# Patient Record
Sex: MALE | Race: BLACK OR AFRICAN AMERICAN | NOT HISPANIC OR LATINO | Employment: OTHER | URBAN - METROPOLITAN AREA
[De-identification: names, ages, dates, MRNs, and addresses within clinical notes are randomized per-mention and may not be internally consistent; named-entity substitution may affect disease eponyms.]

---

## 2017-06-05 ENCOUNTER — OFFICE VISIT (OUTPATIENT)
Dept: INTERNAL MEDICINE | Facility: CLINIC | Age: 52
End: 2017-06-05
Payer: MEDICAID

## 2017-06-05 ENCOUNTER — LAB VISIT (OUTPATIENT)
Dept: LAB | Facility: HOSPITAL | Age: 52
End: 2017-06-05
Attending: INTERNAL MEDICINE
Payer: MEDICAID

## 2017-06-05 VITALS
BODY MASS INDEX: 27.59 KG/M2 | HEART RATE: 56 BPM | SYSTOLIC BLOOD PRESSURE: 120 MMHG | WEIGHT: 197.06 LBS | TEMPERATURE: 98 F | HEIGHT: 71 IN | DIASTOLIC BLOOD PRESSURE: 84 MMHG

## 2017-06-05 DIAGNOSIS — E55.9 VITAMIN D DEFICIENCY: ICD-10-CM

## 2017-06-05 DIAGNOSIS — Z00.00 HEALTH CARE MAINTENANCE: ICD-10-CM

## 2017-06-05 DIAGNOSIS — M54.9 BACK PAIN WITH RADIATION: Primary | ICD-10-CM

## 2017-06-05 LAB
25(OH)D3+25(OH)D2 SERPL-MCNC: 36 NG/ML
ALBUMIN SERPL BCP-MCNC: 4.2 G/DL
ALP SERPL-CCNC: 47 U/L
ALT SERPL W/O P-5'-P-CCNC: 21 U/L
ANION GAP SERPL CALC-SCNC: 8 MMOL/L
AST SERPL-CCNC: 25 U/L
BILIRUB SERPL-MCNC: 1.1 MG/DL
BUN SERPL-MCNC: 19 MG/DL
CALCIUM SERPL-MCNC: 9.5 MG/DL
CHLORIDE SERPL-SCNC: 105 MMOL/L
CHOLEST/HDLC SERPL: 3.4 {RATIO}
CO2 SERPL-SCNC: 27 MMOL/L
CREAT SERPL-MCNC: 1.3 MG/DL
EST. GFR  (AFRICAN AMERICAN): >60 ML/MIN/1.73 M^2
EST. GFR  (NON AFRICAN AMERICAN): >60 ML/MIN/1.73 M^2
GLUCOSE SERPL-MCNC: 91 MG/DL
HDL/CHOLESTEROL RATIO: 29.6 %
HDLC SERPL-MCNC: 162 MG/DL
HDLC SERPL-MCNC: 48 MG/DL
LDLC SERPL CALC-MCNC: 101 MG/DL
NONHDLC SERPL-MCNC: 114 MG/DL
POTASSIUM SERPL-SCNC: 4.3 MMOL/L
PROT SERPL-MCNC: 7.3 G/DL
SODIUM SERPL-SCNC: 140 MMOL/L
TRIGL SERPL-MCNC: 65 MG/DL

## 2017-06-05 PROCEDURE — 99214 OFFICE O/P EST MOD 30 MIN: CPT | Mod: S$PBB,,, | Performed by: INTERNAL MEDICINE

## 2017-06-05 PROCEDURE — 36415 COLL VENOUS BLD VENIPUNCTURE: CPT

## 2017-06-05 PROCEDURE — 99999 PR PBB SHADOW E&M-EST. PATIENT-LVL III: CPT | Mod: PBBFAC,,, | Performed by: INTERNAL MEDICINE

## 2017-06-05 PROCEDURE — 82306 VITAMIN D 25 HYDROXY: CPT

## 2017-06-05 PROCEDURE — 80053 COMPREHEN METABOLIC PANEL: CPT

## 2017-06-05 PROCEDURE — 80061 LIPID PANEL: CPT

## 2017-06-05 RX ORDER — MELOXICAM 15 MG/1
15 TABLET ORAL DAILY
Qty: 60 TABLET | Refills: 3 | Status: SHIPPED | OUTPATIENT
Start: 2017-06-05

## 2017-06-05 RX ORDER — ACETAMINOPHEN 325 MG/1
325 TABLET ORAL EVERY 6 HOURS PRN
COMMUNITY

## 2017-06-05 RX ORDER — CYCLOBENZAPRINE HCL 10 MG
10 TABLET ORAL 3 TIMES DAILY PRN
Qty: 60 TABLET | Refills: 3 | Status: SHIPPED | OUTPATIENT
Start: 2017-06-05 | End: 2017-07-05

## 2017-06-05 RX ORDER — CHOLECALCIFEROL (VITAMIN D3) 25 MCG
1000 TABLET ORAL DAILY
COMMUNITY
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
## 2017-06-09 ENCOUNTER — OFFICE VISIT (OUTPATIENT)
Dept: INTERNAL MEDICINE | Facility: CLINIC | Age: 52
End: 2017-06-09
Payer: MEDICAID

## 2017-06-09 ENCOUNTER — LAB VISIT (OUTPATIENT)
Dept: LAB | Facility: HOSPITAL | Age: 52
End: 2017-06-09
Attending: INTERNAL MEDICINE
Payer: MEDICAID

## 2017-06-09 VITALS
HEIGHT: 71 IN | HEART RATE: 54 BPM | BODY MASS INDEX: 26.57 KG/M2 | DIASTOLIC BLOOD PRESSURE: 62 MMHG | WEIGHT: 189.81 LBS | SYSTOLIC BLOOD PRESSURE: 106 MMHG

## 2017-06-09 DIAGNOSIS — Z11.3 ROUTINE SCREENING FOR STI (SEXUALLY TRANSMITTED INFECTION): ICD-10-CM

## 2017-06-09 DIAGNOSIS — M54.9 BACK PAIN WITH RADIATION: Primary | ICD-10-CM

## 2017-06-09 LAB
HAV IGM SERPL QL IA: NEGATIVE
HBV CORE IGM SERPL QL IA: NEGATIVE
HBV SURFACE AG SERPL QL IA: NEGATIVE
HCV AB SERPL QL IA: NEGATIVE
HIV 1+2 AB+HIV1 P24 AG SERPL QL IA: NEGATIVE

## 2017-06-09 PROCEDURE — 99999 PR PBB SHADOW E&M-EST. PATIENT-LVL III: CPT | Mod: PBBFAC,,, | Performed by: INTERNAL MEDICINE

## 2017-06-09 PROCEDURE — 80074 ACUTE HEPATITIS PANEL: CPT

## 2017-06-09 PROCEDURE — 86592 SYPHILIS TEST NON-TREP QUAL: CPT

## 2017-06-09 PROCEDURE — 36415 COLL VENOUS BLD VENIPUNCTURE: CPT

## 2017-06-09 PROCEDURE — 99214 OFFICE O/P EST MOD 30 MIN: CPT | Mod: S$PBB,,, | Performed by: INTERNAL MEDICINE

## 2017-06-09 PROCEDURE — 86695 HERPES SIMPLEX TYPE 1 TEST: CPT

## 2017-06-09 PROCEDURE — 86703 HIV-1/HIV-2 1 RESULT ANTBDY: CPT

## 2017-06-09 PROCEDURE — 86696 HERPES SIMPLEX TYPE 2 TEST: CPT

## 2017-06-09 NOTE — PROGRESS NOTES
Subjective:       Patient ID: Royer Driscoll is a 51 y.o. male.    Chief Complaint: Back Pain    HPI - I just saw Mr. Driscoll 4 days ago.  He neglected to mention concerns about STI at that time - he has had a second partner (female) and had irritation of the tip of his penis after intercourse.  No dysuria/discharge or other rashes.  He wants full STI screening.  Drinks minimally; no drug use.    He also requested an MRI for his chronic low back pain.  He notes worsening of pain this weekend after doing some work around the house.      PMH:   Chronic thoracic spine pain  Vitamin D deficiency  ED  Thrombocytopenia, mild     Meds: none    Review of Systems   Constitutional: Negative for fever.   HENT: Negative for congestion.    Respiratory: Negative for shortness of breath.    Cardiovascular: Negative for chest pain.   Gastrointestinal: Negative for abdominal pain.   Genitourinary: Positive for genital sores. Negative for difficulty urinating, discharge, dysuria, flank pain, penile pain, penile swelling, scrotal swelling, testicular pain and urgency.   Musculoskeletal: Negative for arthralgias.   Skin: Negative for rash.   Neurological: Negative for headaches.   Psychiatric/Behavioral: Negative for sleep disturbance.       Objective:      Physical Exam   Constitutional: He appears well-developed and well-nourished. No distress.   Abdominal: Hernia confirmed negative in the right inguinal area and confirmed negative in the left inguinal area.   Genitourinary: Testes normal and penis normal. Right testis shows no mass, no swelling and no tenderness. Left testis shows no mass, no swelling and no tenderness. Uncircumcised.   Neurological: He is alert.   Skin: He is not diaphoretic.   Psychiatric: He has a normal mood and affect.   Nursing note and vitals reviewed.      Assessment:       1. Back pain with radiation    2. Routine screening for STI (sexually transmitted infection)        Plan:       Royer was seen today  for back pain.    Diagnoses and all orders for this visit:    Back pain with radiation - worsening.  Will ask pain clinic to assist with management.  -     Ambulatory consult to Pain Clinic    Routine screening for STI (sexually transmitted infection) - new complaint.  Given this lesion on the tip of his penis, think he has HSV.  Will screen broadly for STI's today.  -     HIV-1 and HIV-2 antibodies; Future  -     C. trachomatis/N. gonorrhoeae by AMP DNA Urine  -     RPR; Future  -     Hepatitis panel, acute; Future  -     HERPES SIMPLEX 1 & 2 IGM; Future    rtc prn, or as scheduled in a year    MELANIE Dyer MD MPH  Staff Internist

## 2017-06-10 LAB
C TRACH DNA SPEC QL NAA+PROBE: NOT DETECTED
N GONORRHOEA DNA SPEC QL NAA+PROBE: NOT DETECTED
RPR SER QL: NORMAL

## 2017-06-15 LAB — HSV1+2 IGM SER IA-ACNC: NORMAL INDEX

## 2019-04-10 ENCOUNTER — HOSPITAL ENCOUNTER (OUTPATIENT)
Dept: RADIOLOGY | Facility: HOSPITAL | Age: 54
Discharge: HOME | End: 2019-04-10
Attending: GENERAL PRACTICE
Payer: MEDICAID

## 2019-04-10 DIAGNOSIS — M75.21 BICIPITAL TENDINITIS OF RIGHT SHOULDER: ICD-10-CM

## 2019-04-10 DIAGNOSIS — M75.21 BICIPITAL TENDINITIS OF RIGHT SHOULDER: Primary | ICD-10-CM

## 2019-04-10 PROCEDURE — 73030 X-RAY EXAM OF SHOULDER: CPT | Mod: 26,RT,, | Performed by: RADIOLOGY

## 2019-04-10 PROCEDURE — 73030 X-RAY EXAM OF SHOULDER: CPT | Mod: TC,FY,RT

## 2019-04-10 PROCEDURE — 73030 XR SHOULDER COMPLETE 2 OR MORE VIEWS RIGHT: ICD-10-PCS | Mod: 26,RT,, | Performed by: RADIOLOGY

## 2019-06-26 DIAGNOSIS — M75.21 BICIPITAL TENDINITIS OF RIGHT SHOULDER: Primary | ICD-10-CM

## 2019-07-02 DIAGNOSIS — M75.21 BICIPITAL TENDINITIS OF RIGHT SHOULDER: Primary | ICD-10-CM

## 2019-07-20 ENCOUNTER — TELEPHONE (OUTPATIENT)
Dept: RADIOLOGY | Facility: HOSPITAL | Age: 54
End: 2019-07-20

## 2019-07-22 ENCOUNTER — HOSPITAL ENCOUNTER (OUTPATIENT)
Dept: RADIOLOGY | Facility: HOSPITAL | Age: 54
Discharge: HOME | End: 2019-07-22
Attending: GENERAL PRACTICE
Payer: MEDICAID

## 2019-07-22 DIAGNOSIS — M75.21 BICIPITAL TENDINITIS OF RIGHT SHOULDER: ICD-10-CM

## 2019-07-22 PROCEDURE — 73221 MRI SHOULDER WITHOUT CONTRAST RIGHT: ICD-10-PCS | Mod: 26,RT,, | Performed by: RADIOLOGY

## 2019-07-22 PROCEDURE — 73221 MRI JOINT UPR EXTREM W/O DYE: CPT | Mod: 26,RT,, | Performed by: RADIOLOGY

## 2019-07-22 PROCEDURE — 73221 MRI JOINT UPR EXTREM W/O DYE: CPT | Mod: TC,RT

## 2021-03-09 ENCOUNTER — HOSPITAL ENCOUNTER (OUTPATIENT)
Dept: RADIOLOGY | Facility: HOSPITAL | Age: 56
Discharge: HOME | End: 2021-03-09
Attending: NURSE PRACTITIONER
Payer: MEDICAID

## 2021-03-09 DIAGNOSIS — M25.551 PAIN IN RIGHT HIP: ICD-10-CM

## 2021-03-09 PROCEDURE — 73502 X-RAY EXAM HIP UNI 2-3 VIEWS: CPT | Mod: TC,FY,PO,RT

## 2021-03-09 PROCEDURE — 73502 XR HIP 2 VIEW RIGHT: ICD-10-PCS | Mod: 26,RT,, | Performed by: RADIOLOGY

## 2021-03-09 PROCEDURE — 73502 X-RAY EXAM HIP UNI 2-3 VIEWS: CPT | Mod: 26,RT,, | Performed by: RADIOLOGY

## 2021-04-15 ENCOUNTER — PATIENT MESSAGE (OUTPATIENT)
Dept: RESEARCH | Facility: HOSPITAL | Age: 56
End: 2021-04-15

## 2022-01-20 ENCOUNTER — CLINICAL SUPPORT (OUTPATIENT)
Dept: REHABILITATION | Facility: OTHER | Age: 57
End: 2022-01-20
Attending: GENERAL PRACTICE
Payer: MEDICAID

## 2022-01-20 DIAGNOSIS — R68.89 DECREASED FUNCTIONAL ACTIVITY TOLERANCE: ICD-10-CM

## 2022-01-20 DIAGNOSIS — M54.50 LOW BACK PAIN: ICD-10-CM

## 2022-01-20 DIAGNOSIS — M54.9 BACK PAIN WITH RADIATION: Primary | ICD-10-CM

## 2022-01-20 DIAGNOSIS — R29.898 WEAKNESS OF BOTH LOWER EXTREMITIES: ICD-10-CM

## 2022-01-20 PROCEDURE — 97530 THERAPEUTIC ACTIVITIES: CPT

## 2022-01-20 PROCEDURE — 97161 PT EVAL LOW COMPLEX 20 MIN: CPT

## 2022-01-20 PROCEDURE — 97110 THERAPEUTIC EXERCISES: CPT

## 2022-01-24 PROBLEM — R68.89 DECREASED FUNCTIONAL ACTIVITY TOLERANCE: Status: ACTIVE | Noted: 2022-01-24

## 2022-01-24 PROBLEM — R29.898 WEAKNESS OF BOTH LOWER EXTREMITIES: Status: ACTIVE | Noted: 2022-01-24

## 2022-02-02 ENCOUNTER — CLINICAL SUPPORT (OUTPATIENT)
Dept: REHABILITATION | Facility: HOSPITAL | Age: 57
End: 2022-02-02
Payer: MEDICAID

## 2022-02-02 DIAGNOSIS — R29.898 WEAKNESS OF BOTH LOWER EXTREMITIES: ICD-10-CM

## 2022-02-02 DIAGNOSIS — R68.89 DECREASED FUNCTIONAL ACTIVITY TOLERANCE: ICD-10-CM

## 2022-02-02 PROCEDURE — 97110 THERAPEUTIC EXERCISES: CPT | Mod: PN

## 2022-02-02 NOTE — PROGRESS NOTES
"[facility]      Name: Royer Driscoll  Clinic Number: [id number]    Therapy Diagnosis:   Encounter Diagnoses   Name Primary?    Decreased functional activity tolerance     Weakness of both lower extremities      Physician: Zulma Aj NP    Visit Date: 2022    Physician Orders: PT Eval and Treat   Medical Diagnosis from Referral: Low back pain [M54.50]  Evaluation Date: 2022  Authorization Period Expiration: 2022  Plan of Care Expiration: 2022  Visit # / Visits authorized:    Progress Note Due: 2022  FOTO:      Precautions: Standard    Time In: 8:30 am  Time Out: 9:30 am  Total Billable Time: 60 minutes    Pattern of pain determined: Pattern 1 PEP    Subjective   Royer reports he has gone through healthy [facility] before and had a lot of relief from it in the past.      Patient reports tolerating previous visit well  Patient reports their pain to be 4/10 on a 0-10 scale with 0 being no pain and 10 being the worst pain imaginable.  Pain Location: Low back     Occupation: Audio/vidrographer   Pts goals: "less pain and be able to sit for longer durations"     Objective     Baseline IM Testing Results:   Date of testin2022  ROM 0-36 deg   Max Peak Torque 104    Min Peak Torque 42    Flex/Ext Ratio 2.4   % below normative data 66     Outcomes:  Initial score: 37%  Visit 5 score:  Goal:      Treatment    Pt was instructed in and performed the following:     Royer received therapeutic exercises to develop/improved posture, cardiovascular endurance, muscular endurance, lumbar/cervical ROM, strength and muscular endurance for 60 minutes including the following exercises:     +Hamstring stretch  +Priformis stretch   +TrA activation      [facility] 2022   Visit Number 2   VAS Pain Rating 4   Treadmill Time (in min.) 5   Speed 2.5   Lumbar Extension Seat Pad 1   Femur Restraint 6   Top Dead Center 24   Counterweight 272   Lumbar " Flexion 36   Lumbar Extension 0   Lumbar Peak Torque 104   Min Torque 42   Test Percent Below Normative Data 66         Peripheral muscle strengthening which included 1 set of 15-20 repetitions at a slow, controlled 10-13 second per rep pace focused on strengthening supporting musculature for improved body mechanics and functional mobility.  Pt and therapist focused on proper form during treatment to ensure optimal strengthening of each targeted muscle group.  Machines were utilized including torso rotation, leg extension, leg curl, chest press, upright row. Tricep extension, bicep curl, leg press, and hip abduction added visit 3      Education provided:   - rationale for progressions    Written Home Exercises Provided: Patient instructed to cont prior HEP.  Exercises were reviewed and Forrestmaurice was able to demonstrate them prior to the end of the session.  Royer demonstrated good  understanding of the education provided.     See EMR under Patient Instructions for exercises provided prior visit.          Assessment   Americo performed well in today's session. Home exercises reviewed c/ fair recall by pt. Good response noted. Pt was tested on the Med-X machine today because he was transferred from [facility] to participate in the [facility]. Pt completed UE peripheral exercises in addition to isometric testing. Continue to progress based on to tolerance.     Patient is making good progress towards established goals.  Pt will continue to benefit from skilled outpatient physical therapy to address the deficits stated in the impairment chart, provide pt/family education and to maximize pt's level of independence in the home and community environment.     Anticipated Barriers for therapy: none  Pt's spiritual, cultural and educational needs considered and pt agreeable to plan of care and goals as stated below:     Goals:   Short Term Goals:  4 weeks  1. Report decreased in pain at worse less than  <   / =  8  /10   to increase tolerance for functional activities.On going  3. Increased core and bilateral LE MMT 1/2  to increase tolerance for ADL and work activities.On going  5. Pt to tolerate HEP to improve ROM and independence with ADL's.On going  6. Pt to improve range of motion by 25% to allow for improved functional mobility to allow for improvement in IADLs. On going    Long Term Goals: 8 weeks  1. Report decreased in pain at worse less than  <   / =  1  /10  to increase tolerance for functional mobility.  On going  3. Increased core and bilateral LE MMT 1 grade to increase tolerance for ADL and work activities.On going  4. Pt will report at 34% or less limitation on FOTO Lumbar spine survey  to demonstrate decrease in disability and improvement in back pain.On going  5. Pt to be Independent with HEP to improve ROM and independence with ADL's. On going  7. Pt to demonstrate negative Bridge Test in order to show improved core strength for lumbar stabilization. On going  8. Pt to improve range of motion by 75% to allow for improved functional mobility to allow for improvement in IADLs. On going    Plan   Plan of care Certification: 1/20/2022 to 03/17/2022    Continue with established Plan of Care towards established PT goals.     Katarzyna Hartmann, PT, DPT

## 2022-02-03 NOTE — PROGRESS NOTES
"[facility]      Name: Royer Driscoll  Clinic Number: [id number]    Therapy Diagnosis:   Encounter Diagnoses   Name Primary?    Decreased functional activity tolerance     Weakness of both lower extremities      Physician: Zulma Aj NP    Visit Date: 2022    Physician Orders: PT Eval and Treat   Medical Diagnosis from Referral: Low back pain [M54.50]  Evaluation Date: 2022  Authorization Period Expiration: 2022  Plan of Care Expiration: 2022  Visit # / Visits authorized: 3/13   Reassessment Due: 2022     Precautions: Standard    Time In: 1004  Time Out: 1110  Total Billable Time: 66 minutes    Pattern of pain determined: Pattern 1 PEP    Subjective   Royer reports back is feeling okay today. Remarks that he experiences this most pain c/ prolonged sitting, driving, and at end of that day. Has been performing stretches at home.     Patient reports tolerating previous visit well  Patient reports their pain to be 2/10 on a 0-10 scale with 0 being no pain and 10 being the worst pain imaginable.  Pain Location: low back     Occupation: Audio/   Pts goals: "less pain and be able to sit for longer durations"     Objective     Baseline IM Testing Results:   Date of testin2022  ROM 0-36 deg   Max Peak Torque 104    Min Peak Torque 42    Flex/Ext Ratio 2.4   % below normative data 66     Outcomes:  Initial score: 37%  Visit 5 score:  Goal:      Treatment    Pt was instructed in and performed the following:     Royer received therapeutic exercises to develop/improved posture, cardiovascular endurance, muscular endurance, lumbar/cervical ROM, strength and muscular endurance for 63 minutes including the following exercises:     +Standing ext, 5 sec holds x10  +Slouch correct, x15  +Scapular retractions + B shoulder ER c/ RTB, 2x10  +LTR, x10  +DKTC, 10 sec holds x15  Hamstring stretch, 2x30" ea   Piriformis stretch, 2x30" ea  PPT, x20 " (VCs for correct performance)    [facility] 2/4/2022   Visit Number 3   VAS Pain Rating -   Treadmill Time (in min.) 10   Speed 2.5   Lumbar Stretches - Slouch Overcorrection 10   Extension in Standing 10   Flexion in Lying 15   Lumbar Extension Seat Pad -   Femur Restraint -   Top Dead Center -   Counterweight -   Lumbar Flexion -   Lumbar Extension -   Lumbar Peak Torque -   Min Torque -   Test Percent Below Normative Data -   Lumbar Weight 50   Repetitions 15   Rating of Perceived Exertion 4       Peripheral muscle strengthening which included 1 set of 15-20 repetitions at a slow, controlled 10-13 second per rep pace focused on strengthening supporting musculature for improved body mechanics and functional mobility.  Pt and therapist focused on proper form during treatment to ensure optimal strengthening of each targeted muscle group.  Machines were utilized including torso rotation, leg extension, leg curl, chest press, upright row. Tricep extension, bicep curl, leg press, and hip abduction added visit 3      Education provided:   - Rationale for POC and progressions  - MedX strengthening: technique and pacing    Written Home Exercises Provided: Patient instructed to cont prior HEP.  Exercises were reviewed and Royer was able to demonstrate them prior to the end of the session.  Royer demonstrated good  understanding of the education provided.     See EMR under Patient Instructions for exercises provided prior visit.      Assessment   Americo performed well in today's session. HEP reviewed c/ good understanding demonstrated following. Pt also educated on exercises for lumbar mobility and decompression (to be performed daily). These exercises added to HEP and printout given to pt. MedX resistance initially set to 50 ft lbs c/ pt performing 15 reps c/ a reported exertion of 4/10. All peripheral machines tolerated without exacerbation of pain. Will continue to progress as appropriate and tolerable.        Patient is making good progress towards established goals.  Pt will continue to benefit from skilled outpatient physical therapy to address the deficits stated in the impairment chart, provide pt/family education and to maximize pt's level of independence in the home and community environment.     Anticipated Barriers for therapy: none  Pt's spiritual, cultural and educational needs considered and pt agreeable to plan of care and goals as stated below:     Goals:   Short Term Goals:  4 weeks  1. Report decreased in pain at worse less than  <   / =  8  /10  to increase tolerance for functional activities.On going  3. Increased core and bilateral LE MMT 1/2  to increase tolerance for ADL and work activities.On going  5. Pt to tolerate HEP to improve ROM and independence with ADL's.On going  6. Pt to improve range of motion by 25% to allow for improved functional mobility to allow for improvement in IADLs. On going    Long Term Goals: 8 weeks  1. Report decreased in pain at worse less than  <   / =  1  /10  to increase tolerance for functional mobility.  On going  3. Increased core and bilateral LE MMT 1 grade to increase tolerance for ADL and work activities.On going  4. Pt will report at 34% or less limitation on FOTO Lumbar spine survey  to demonstrate decrease in disability and improvement in back pain.On going  5. Pt to be Independent with HEP to improve ROM and independence with ADL's. On going  7. Pt to demonstrate negative Bridge Test in order to show improved core strength for lumbar stabilization. On going  8. Pt to improve range of motion by 75% to allow for improved functional mobility to allow for improvement in IADLs. On going    Plan   Continue with established Plan of Care towards established PT goals.     Deidre Moncada, PT, DPT  02/03/22

## 2022-02-04 ENCOUNTER — CLINICAL SUPPORT (OUTPATIENT)
Dept: REHABILITATION | Facility: HOSPITAL | Age: 57
End: 2022-02-04
Payer: MEDICAID

## 2022-02-04 DIAGNOSIS — R29.898 WEAKNESS OF BOTH LOWER EXTREMITIES: ICD-10-CM

## 2022-02-04 DIAGNOSIS — R68.89 DECREASED FUNCTIONAL ACTIVITY TOLERANCE: ICD-10-CM

## 2022-02-04 PROCEDURE — 97110 THERAPEUTIC EXERCISES: CPT | Mod: PN

## 2022-02-04 NOTE — PATIENT INSTRUCTIONS
To be performed daily for gentle mobility, pressure relief of low back, and to decrease frequency and intensity of flare-ups:          Perform during periods of prolonged sitting:

## 2022-02-11 ENCOUNTER — CLINICAL SUPPORT (OUTPATIENT)
Dept: REHABILITATION | Facility: HOSPITAL | Age: 57
End: 2022-02-11
Payer: MEDICAID

## 2022-02-11 DIAGNOSIS — R29.898 WEAKNESS OF BOTH LOWER EXTREMITIES: ICD-10-CM

## 2022-02-11 DIAGNOSIS — R68.89 DECREASED FUNCTIONAL ACTIVITY TOLERANCE: Primary | ICD-10-CM

## 2022-02-11 PROCEDURE — 97110 THERAPEUTIC EXERCISES: CPT | Mod: PN,CQ

## 2022-02-11 NOTE — PROGRESS NOTES
"[facility]      Name: Royer Driscoll  Clinic Number: [id number]    Therapy Diagnosis:   Encounter Diagnoses   Name Primary?    Decreased functional activity tolerance Yes    Weakness of both lower extremities      Physician: Zulma Aj NP    Visit Date: 2022    Physician Orders: PT Eval and Treat   Medical Diagnosis from Referral: Low back pain [M54.50]  Evaluation Date: 2022  Authorization Period Expiration: 2022  Plan of Care Expiration: 2022  Visit # / Visits authorized: 3/13   Reassessment Due: 2022     Precautions: Standard    Time In: 1:48 pm   Time Out: 2:54 pm   Total Billable Time: 43 minutes    Pattern of pain determined: Pattern 1 PEP    Subjective   Royer reports back is doing okay. He still has a little tightness which he feels every now and then with activity.     Patient reports tolerating previous visit with soreness. Some pain  Patient reports their pain to be 2/10 on a 0-10 scale with 0 being no pain and 10 being the worst pain imaginable.  Pain Location: low back     Occupation: Audio/video installation   Pts goals: "less pain and be able to sit for longer durations"     Objective     Baseline IM Testing Results:   Date of testin2022  ROM 0-36 deg   Max Peak Torque 104    Min Peak Torque 42    Flex/Ext Ratio 2.4   % below normative data 66     Outcomes:  Initial score: 37%  Visit 5 score:  Goal:      Treatment    Pt was instructed in and performed the following:     Royer received therapeutic exercises to develop/improved posture, cardiovascular endurance, muscular endurance, lumbar/cervical ROM, strength and muscular endurance for 66 minutes including the following exercises:     Standing ext, 5 sec holds x10  Slouch correct, x15  Scapular retractions + B shoulder ER c/ RTB, 2x10  LTR, x10  DKTC, 10 sec holds x15  Hamstring stretch, 2x30" ea   Piriformis stretch, 2x30" ea  PPT, x20 (VCs for correct " "performance)  +bridges: 20 x 3" hold  +SL hip abduction x 15         50/18/4    Peripheral muscle strengthening which included 1 set of 15-20 repetitions at a slow, controlled 10-13 second per rep pace focused on strengthening supporting musculature for improved body mechanics and functional mobility.  Pt and therapist focused on proper form during treatment to ensure optimal strengthening of each targeted muscle group.  Machines were utilized including torso rotation, leg extension, leg curl, chest press, upright row. Tricep extension, bicep curl, leg press, and hip abduction added visit 3      Education provided:   - Rationale for POC and progressions  - MedX strengthening: technique and pacing    Written Home Exercises Provided: Patient instructed to cont prior HEP.  Exercises were reviewed and Royer was able to demonstrate them prior to the end of the session.  Royer demonstrated good  understanding of the education provided.     See EMR under Patient Instructions for exercises provided prior visit.      Assessment   Royer tolerated session well today. Continued previously prescribed exercises without issue. Added hip strengthening with good performance.  MedX was performed at 50ft lbs with 18 reps performed at an exertion rating of 4/10.       Patient is making good progress towards established goals.  Pt will continue to benefit from skilled outpatient physical therapy to address the deficits stated in the impairment chart, provide pt/family education and to maximize pt's level of independence in the home and community environment.     Anticipated Barriers for therapy: none  Pt's spiritual, cultural and educational needs considered and pt agreeable to plan of care and goals as stated below:     Goals:   Short Term Goals:  4 weeks  1. Report decreased in pain at worse less than  <   / =  8  /10  to increase tolerance for functional activities.On going  3. Increased core and bilateral LE MMT 1/2  to increase " tolerance for ADL and work activities.On going  5. Pt to tolerate HEP to improve ROM and independence with ADL's.On going  6. Pt to improve range of motion by 25% to allow for improved functional mobility to allow for improvement in IADLs. On going    Long Term Goals: 8 weeks  1. Report decreased in pain at worse less than  <   / =  1  /10  to increase tolerance for functional mobility.  On going  3. Increased core and bilateral LE MMT 1 grade to increase tolerance for ADL and work activities.On going  4. Pt will report at 34% or less limitation on FOTO Lumbar spine survey  to demonstrate decrease in disability and improvement in back pain.On going  5. Pt to be Independent with HEP to improve ROM and independence with ADL's. On going  7. Pt to demonstrate negative Bridge Test in order to show improved core strength for lumbar stabilization. On going  8. Pt to improve range of motion by 75% to allow for improved functional mobility to allow for improvement in IADLs. On going    Plan   Continue with established Plan of Care towards established PT goals.     Morris Giordano, PTA  02/11/2022

## 2022-02-17 ENCOUNTER — CLINICAL SUPPORT (OUTPATIENT)
Dept: REHABILITATION | Facility: HOSPITAL | Age: 57
End: 2022-02-17
Payer: MEDICAID

## 2022-02-17 DIAGNOSIS — R29.898 WEAKNESS OF BOTH LOWER EXTREMITIES: ICD-10-CM

## 2022-02-17 DIAGNOSIS — R68.89 DECREASED FUNCTIONAL ACTIVITY TOLERANCE: Primary | ICD-10-CM

## 2022-02-17 PROCEDURE — 97110 THERAPEUTIC EXERCISES: CPT | Mod: PN | Performed by: PHYSICAL MEDICINE & REHABILITATION

## 2022-02-17 NOTE — PROGRESS NOTES
"[facility]      Name: Royer Driscoll  Clinic Number: [id number]    Therapy Diagnosis:   Encounter Diagnoses   Name Primary?    Decreased functional activity tolerance Yes    Weakness of both lower extremities      Physician: Zulma Aj NP    Visit Date: 2022    Physician Orders: PT Eval and Treat   Medical Diagnosis from Referral: Low back pain [M54.50]  Evaluation Date: 2022  Authorization Period Expiration: 2022  Plan of Care Expiration: 2022  Visit # / Visits authorized:    Reassessment Due: 22     Precautions: Standard    Time In: 4:30 pm   Time Out: 5:42 pm   Total Billable Time: 63 minutes    Pattern of pain determined: Pattern 1 PEP    Subjective   Royer reports back is doing okay. He still has a little tightness which he feels every now and then with activity. He has 3/10 back pain.  He felt better after visit.  He has treadmill but needs to get it fixed.  He use to walk in his neighborhood and wants to again.  We started walking program today.  He has lumbar roll but hasn't been using it-he will find it and use it.    Patient reports tolerating previous visit feeling good  Patient reports their pain to be 3/10 on a 0-10 scale with 0 being no pain and 10 being the worst pain imaginable.  Pain Location: low back     Occupation: Audio/video installation   Pts goals: "less pain and be able to sit for longer durations"     Objective     Baseline IM Testing Results:   Date of testin2022  ROM 0-36 deg 0-42 on 22   Max Peak Torque 104    Min Peak Torque 42    Flex/Ext Ratio 2.4   % below normative data 66       Lumbar Range of Motion:     %   Flexion WFL  +increased hamstring tightness       Extension WFL      Left Side Bending WFL   Right Side Bending WFL   Left rotation    WFL   Right Rotation    WFL           Outcomes:  Initial score: 37%  Visit 5 score: 43% limited  Goal:      Treatment    Pt was instructed in and " "performed the following:     Royer received therapeutic exercises to develop/improved posture, cardiovascular endurance, muscular endurance, lumbar/cervical ROM, strength and muscular endurance for 66 minutes including the following exercises:     Standing ext, 5 sec holds x10  Ext in lie 2 sets 10 with slight reduction in pain  Slouch correct, x15  Scapular retractions + B shoulder ER c/ RTB, 2x10  LTR, x10  DKTC, 10 sec holds x15  Hamstring stretch, 2x30" ea   Piriformis stretch, 2x30" ea  PPT, x20 (VCs for correct performance)  +bridges: 20 x 3" hold  +SL hip abduction x 15     Cardio program discussed and added to HEP    [facility] 2/17/2022   Visit Number 5   VAS Pain Rating 3   Treadmill Time (in min.) 5   Speed 2.5   Lumbar Stretches - Slouch Overcorrection -   Extension in Lying 10   Extension in Standing 10   Flexion in Lying 10   Lumbar Extension Seat Pad -   Femur Restraint -   Top Dead Center -   Counterweight -   Lumbar Flexion 42   Lumbar Extension 0   Lumbar Peak Torque -   Min Torque -   Test Percent Below Normative Data -   Lumbar Weight 50   Repetitions 20   Rating of Perceived Exertion 4   Ice - Z Lie (in min.) 10             Peripheral muscle strengthening which included 1 set of 15-20 repetitions at a slow, controlled 10-13 second per rep pace focused on strengthening supporting musculature for improved body mechanics and functional mobility.  Pt and therapist focused on proper form during treatment to ensure optimal strengthening of each targeted muscle group.  Machines were utilized including torso rotation, leg extension, leg curl, chest press, upright row, tricep extension, bicep curl, leg press, and hip abduction      Education provided:   - Rationale for POC and progressions  - MedX strengthening: technique and pacing  -cardio program and benefits and walking program  -using lumbar roll      Written Home Exercises Provided: Patient instructed to cont prior HEP.  Stretching: " extensions in standing, ext in lie, hamstring stretch, knees to chest  Strengthening:  Cardio: started 2/17/22 with handouts given  Lifting: not yet started  Lumbar roll: he has to find and use    Exercises were reviewed and Royer was able to demonstrate them prior to the end of the session.  Royer demonstrated good  understanding of the education provided.     See EMR under Patient Instructions for exercises provided prior visit.  Cardio added today      Assessment   Royer tolerated session well today. Continued previously prescribed exercises without issue.  Added cardio program, reminded him of posture and encouraged lumbar roll use.     MedX was performed at 50ft lbs with 20 reps performed at an exertion rating of 4/10, with range increased to 0-42.       Patient is making good progress towards established goals.  Pt will continue to benefit from skilled outpatient physical therapy to address the deficits stated in the impairment chart, provide pt/family education and to maximize pt's level of independence in the home and community environment.     Anticipated Barriers for therapy: none  Pt's spiritual, cultural and educational needs considered and pt agreeable to plan of care and goals as stated below:     Goals:   Short Term Goals:  4 weeks  1. Report decreased in pain at worse less than  <   / =  8  /10  to increase tolerance for functional activities.On going  3. Increased core and bilateral LE MMT 1/2  to increase tolerance for ADL and work activities.On going  5. Pt to tolerate HEP to improve ROM and independence with ADL's.On going  6. Pt to improve range of motion by 25% to allow for improved functional mobility to allow for improvement in IADLs. On going    Long Term Goals: 8 weeks  1. Report decreased in pain at worse less than  <   / =  1  /10  to increase tolerance for functional mobility.  On going  3. Increased core and bilateral LE MMT 1 grade to increase tolerance for ADL and work  activities.On going  4. Pt will report at 34% or less limitation on FOTO Lumbar spine survey  to demonstrate decrease in disability and improvement in back pain.On going  5. Pt to be Independent with HEP to improve ROM and independence with ADL's. On going  7. Pt to demonstrate negative Bridge Test in order to show improved core strength for lumbar stabilization. On going  8. Pt to improve range of motion by 75% to allow for improved functional mobility to allow for improvement in IADLs. On going    Plan   Continue with established Plan of Care towards established PT goals.     Tigist Gonsalves, PT  02/17/2022

## 2022-02-25 ENCOUNTER — CLINICAL SUPPORT (OUTPATIENT)
Dept: REHABILITATION | Facility: HOSPITAL | Age: 57
End: 2022-02-25
Payer: MEDICAID

## 2022-02-25 DIAGNOSIS — R68.89 DECREASED FUNCTIONAL ACTIVITY TOLERANCE: Primary | ICD-10-CM

## 2022-02-25 DIAGNOSIS — R29.898 WEAKNESS OF BOTH LOWER EXTREMITIES: ICD-10-CM

## 2022-02-25 PROCEDURE — 97110 THERAPEUTIC EXERCISES: CPT | Mod: PN,CQ

## 2022-02-25 NOTE — PROGRESS NOTES
"[facility]      Name: Royer Driscoll  Clinic Number: [id number]    Therapy Diagnosis:   Encounter Diagnoses   Name Primary?    Decreased functional activity tolerance Yes    Weakness of both lower extremities      Physician: Zulma Aj NP    Visit Date: 2022    Physician Orders: PT Eval and Treat   Medical Diagnosis from Referral: Low back pain [M54.50]  Evaluation Date: 2022  Authorization Period Expiration: 2022  Plan of Care Expiration: 2022  Visit # / Visits authorized:    Reassessment Due: 22     Precautions: Standard    Time In: 9:58 am   Time Out:10:58 am   Total Billable Time: 42 minutes    Pattern of pain determined: Pattern 1 PEP    Subjective   Royer reports back is doing okay. The more activity he does the more pain he has. He has muscle soreness after his sessions he has a lot of muscle soreness.     Patient reports tolerating previous visit with soreness.   Patient reports their pain to be 3/10 on a 0-10 scale with 0 being no pain and 10 being the worst pain imaginable.  Pain Location: low back     Occupation: Audio/video installation   Pts goals: "less pain and be able to sit for longer durations"     Objective     Baseline IM Testing Results:   Date of testin2022  ROM 0-36 deg 0-42 on 22   Max Peak Torque 104    Min Peak Torque 42    Flex/Ext Ratio 2.4   % below normative data 66       Lumbar Range of Motion:     %   Flexion WFL  +increased hamstring tightness       Extension WFL      Left Side Bending WFL   Right Side Bending WFL   Left rotation    WFL   Right Rotation    WFL           Outcomes:  Initial score: 37%  Visit 5 score: 43% limited  Goal:      Treatment    Pt was instructed in and performed the following:     Royer received therapeutic exercises to develop/improved posture, cardiovascular endurance, muscular endurance, lumbar/cervical ROM, strength and muscular endurance for 60 minutes including " "the following exercises:     Standing ext, 5 sec holds x10  Ext in lie 2 sets 10 with slight reduction in pain  Slouch correct, x15  Scapular retractions + B shoulder ER c/ RTB, 2x10  LTR, x10  DKTC, 10 sec holds x15  Hamstring stretch, 2x30" ea   Piriformis stretch, 2x30" ea  PPT, x20 (VCs for correct performance)  bridges: 20 x 3" hold  SL hip abduction x 15     [facility] 2/25/2022   Visit Number 6   VAS Pain Rating 3   Treadmill Time (in min.) 10   Speed -   Lumbar Stretches - Slouch Overcorrection -   Extension in Lying 20   Extension in Standing 10   Flexion in Lying 10   Lumbar Extension Seat Pad -   Femur Restraint -   Top Dead Center -   Counterweight -   Lumbar Flexion -   Lumbar Extension -   Lumbar Peak Torque -   Min Torque -   Test Percent Below Normative Data -   Lumbar Weight 53   Repetitions 16   Rating of Perceived Exertion 3   Ice - Z Lie (in min.) -         Peripheral muscle strengthening which included 1 set of 15-20 repetitions at a slow, controlled 10-13 second per rep pace focused on strengthening supporting musculature for improved body mechanics and functional mobility.  Pt and therapist focused on proper form during treatment to ensure optimal strengthening of each targeted muscle group.  Machines were utilized including torso rotation, leg extension, leg curl, chest press, upright row, tricep extension, bicep curl, leg press, and hip abduction      Education provided:         Written Home Exercises Provided: Patient instructed to cont prior HEP.  Stretching: extensions in standing, ext in lie, hamstring stretch, knees to chest  Strengthening:  Cardio: started 2/17/22 with handouts given  Lifting: not yet started  Lumbar roll: he has to find and use    Exercises were reviewed and Royer was able to demonstrate them prior to the end of the session.  Royer demonstrated good  understanding of the education provided.     See EMR under Patient Instructions for exercises provided prior " visit.  Cardio added today    Assessment   Royer tolerated session well. Continued previous progressions without issue. MedX was performed at 53ft lbs with 16 reps performed at an exertion rating of 3/10. Will continue progressing per pts tolerance.        Patient is making good progress towards established goals.  Pt will continue to benefit from skilled outpatient physical therapy to address the deficits stated in the impairment chart, provide pt/family education and to maximize pt's level of independence in the home and community environment.     Anticipated Barriers for therapy: none  Pt's spiritual, cultural and educational needs considered and pt agreeable to plan of care and goals as stated below:     Goals:   Short Term Goals:  4 weeks  1. Report decreased in pain at worse less than  <   / =  8  /10  to increase tolerance for functional activities.On going  3. Increased core and bilateral LE MMT 1/2  to increase tolerance for ADL and work activities.On going  5. Pt to tolerate HEP to improve ROM and independence with ADL's.On going  6. Pt to improve range of motion by 25% to allow for improved functional mobility to allow for improvement in IADLs. On going    Long Term Goals: 8 weeks  1. Report decreased in pain at worse less than  <   / =  1  /10  to increase tolerance for functional mobility.  On going  3. Increased core and bilateral LE MMT 1 grade to increase tolerance for ADL and work activities.On going  4. Pt will report at 34% or less limitation on FOTO Lumbar spine survey  to demonstrate decrease in disability and improvement in back pain.On going  5. Pt to be Independent with HEP to improve ROM and independence with ADL's. On going  7. Pt to demonstrate negative Bridge Test in order to show improved core strength for lumbar stabilization. On going  8. Pt to improve range of motion by 75% to allow for improved functional mobility to allow for improvement in IADLs. On going    Plan   Continue with  established Plan of Care towards established PT goals.     Morris Giordano, PTA  02/25/2022

## 2022-03-03 ENCOUNTER — CLINICAL SUPPORT (OUTPATIENT)
Dept: REHABILITATION | Facility: HOSPITAL | Age: 57
End: 2022-03-03
Payer: MEDICAID

## 2022-03-03 DIAGNOSIS — R68.89 DECREASED FUNCTIONAL ACTIVITY TOLERANCE: Primary | ICD-10-CM

## 2022-03-03 DIAGNOSIS — R29.898 WEAKNESS OF BOTH LOWER EXTREMITIES: ICD-10-CM

## 2022-03-03 PROCEDURE — 97110 THERAPEUTIC EXERCISES: CPT | Mod: PN,CQ

## 2022-03-03 NOTE — PROGRESS NOTES
"[facility]      Name: Royer Driscoll  Clinic Number: [id number]    Therapy Diagnosis:   Encounter Diagnoses   Name Primary?    Decreased functional activity tolerance Yes    Weakness of both lower extremities      Physician: Zulma Aj NP    Visit Date: 3/3/2022    Physician Orders: PT Eval and Treat   Medical Diagnosis from Referral: Low back pain [M54.50]  Evaluation Date: 2022  Authorization Period Expiration: 2022  Plan of Care Expiration: 2022  Visit # / Visits authorized:    Reassessment Due: 22     Precautions: Standard    Time In: 1045AM   Time Out: 1200PM    Total Billable Time: 80 minutes    Pattern of pain determined: Pattern 1 PEP    Subjective   Royer reports back is stiff and sore (R>L). He also has soreness in both legs. Also reports intermittent radi    Patient reports tolerating previous visit with soreness.   Patient reports their pain to be 3/10 on a 0-10 scale with 0 being no pain and 10 being the worst pain imaginable.  Pain Location: low back     Occupation: Audio/video installation   Pts goals: "less pain and be able to sit for longer durations"     Objective     Baseline IM Testing Results:   Date of testin2022  ROM 0-36 deg 0-42 on 22   Max Peak Torque 104    Min Peak Torque 42    Flex/Ext Ratio 2.4   % below normative data 66       Lumbar Range of Motion:     %   Flexion WFL  +increased hamstring tightness       Extension WFL      Left Side Bending WFL   Right Side Bending WFL   Left rotation    WFL   Right Rotation    WFL           Outcomes:  Initial score: 37%  Visit 5 score: 43% limited  Goal:      Treatment    Pt was instructed in and performed the following:     Royer received therapeutic exercises to develop/improved posture, cardiovascular endurance, muscular endurance, lumbar/cervical ROM, strength and muscular endurance for 80 minutes including the following exercises:     Treadmill 6 min " "  Standing ext, 5 sec holds x10  +Sciatic Nerve Glides R only 15x  +SLR 2x10/ea   +R QL Stretch 3x30"  Ext in lie 2x10, 3-5"   Slouch correct, x15  Bridges: 20 x 3" hold  Scapular retractions + B shoulder ER c/ RTB, 2x10-NP  LTR, x10  DKTC, 10 sec holds x15   Hamstring stretch, 2x30" ea   Piriformis stretch, 2x30" ea  PPT, x20 (VCs for correct performance)  SL hip abduction x 15-NP    [facility] 3/3/2022   Visit Number 7   VAS Pain Rating 4   Treadmill Time (in min.) 6   Speed 2.5   Lumbar Stretches - Slouch Overcorrection 10   Extension in Lying 20   Extension in Standing 10   Flexion in Lying 10   Lumbar Extension Seat Pad -   Femur Restraint -   Top Dead Center -   Counterweight -   Lumbar Flexion -   Lumbar Extension -   Lumbar Peak Torque -   Min Torque -   Test Percent Below Normative Data -   Lumbar Weight 53   Repetitions 18   Rating of Perceived Exertion 3   Ice - Z Lie (in min.) -       Peripheral muscle strengthening which included 1 set of 15-20 repetitions at a slow, controlled 10-13 second per rep pace focused on strengthening supporting musculature for improved body mechanics and functional mobility.  Pt and therapist focused on proper form during treatment to ensure optimal strengthening of each targeted muscle group.  Machines were utilized including torso rotation, leg extension, leg curl, chest press, upright row, tricep extension, bicep curl, leg press, and hip abduction      Education provided:   -Possibility of DOMS  -Continue HEP    Written Home Exercises Provided: Patient instructed to cont prior HEP.  Stretching: extensions in standing, ext in lie, hamstring stretch, knees to chest  Strengthening:  Cardio: started 2/17/22 with handouts given  Lifting: not yet started  Lumbar roll: he has to find and use    Exercises were reviewed and Royer was able to demonstrate them prior to the end of the session.  Royer demonstrated good  understanding of the education provided.     See EMR " under Patient Instructions for exercises provided prior visit.  Cardio added today    Assessment   Royer tolerated session well. Added QL stretch, SLR, and nerve glides to today's session, and there were no adverse effects. Pt displayed hip flexor weakness as well as decreased flexibility in R QL. MedX was maintained at 53ft lbs with 18 reps performed at an exertion rating of 3/10. Will continue progressing per pts tolerance.      Patient is making good progress towards established goals.  Pt will continue to benefit from skilled outpatient physical therapy to address the deficits stated in the impairment chart, provide pt/family education and to maximize pt's level of independence in the home and community environment.     Anticipated Barriers for therapy: none  Pt's spiritual, cultural and educational needs considered and pt agreeable to plan of care and goals as stated below:     Goals:   Short Term Goals:  4 weeks  1. Report decreased in pain at worse less than  <   / =  8  /10  to increase tolerance for functional activities.On going  3. Increased core and bilateral LE MMT 1/2  to increase tolerance for ADL and work activities.On going  5. Pt to tolerate HEP to improve ROM and independence with ADL's.On going  6. Pt to improve range of motion by 25% to allow for improved functional mobility to allow for improvement in IADLs. On going    Long Term Goals: 8 weeks  1. Report decreased in pain at worse less than  <   / =  1  /10  to increase tolerance for functional mobility.  On going  3. Increased core and bilateral LE MMT 1 grade to increase tolerance for ADL and work activities.On going  4. Pt will report at 34% or less limitation on FOTO Lumbar spine survey  to demonstrate decrease in disability and improvement in back pain.On going  5. Pt to be Independent with HEP to improve ROM and independence with ADL's. On going  7. Pt to demonstrate negative Bridge Test in order to show improved core strength for  lumbar stabilization. On going  8. Pt to improve range of motion by 75% to allow for improved functional mobility to allow for improvement in IADLs. On going    Plan   Continue with established Plan of Care towards established PT goals.     Rita Mcintyre, KEYUR  03/03/2022

## 2022-03-08 ENCOUNTER — CLINICAL SUPPORT (OUTPATIENT)
Dept: REHABILITATION | Facility: HOSPITAL | Age: 57
End: 2022-03-08
Payer: MEDICAID

## 2022-03-08 DIAGNOSIS — R68.89 DECREASED FUNCTIONAL ACTIVITY TOLERANCE: Primary | ICD-10-CM

## 2022-03-08 DIAGNOSIS — R29.898 WEAKNESS OF BOTH LOWER EXTREMITIES: ICD-10-CM

## 2022-03-08 PROCEDURE — 97110 THERAPEUTIC EXERCISES: CPT | Mod: PN,CQ

## 2022-03-08 NOTE — PROGRESS NOTES
"[facility]      Name: Royer Driscoll  Clinic Number: [id number]    Therapy Diagnosis:   Encounter Diagnoses   Name Primary?    Decreased functional activity tolerance Yes    Weakness of both lower extremities      Physician: Zulma Aj NP    Visit Date: 3/8/2022    Physician Orders: PT Eval and Treat   Medical Diagnosis from Referral: Low back pain [M54.50]  Evaluation Date: 2022  Authorization Period Expiration: 2022  Plan of Care Expiration: 2022  Visit # / Visits authorized:    Reassessment Due: 22     Precautions: Standard    Time In: 10:45 am   Time Out: 11:42 am   Total Billable Time: 45 minutes    Pattern of pain determined: Pattern 1 PEP    Subjective   Royer reports his back is not too bad. He hasn't done anything strenuous the past couple days so it hasn't bothered him. He has been doing his exercises daily. He is feeling a little stronger.     Patient reports tolerating previous visit with a little soreness.   Patient reports their pain to be 2/10 on a 0-10 scale with 0 being no pain and 10 being the worst pain imaginable.  Pain Location: low back     Occupation: Audio/video installation   Pts goals: "less pain and be able to sit for longer durations"     Objective     Baseline IM Testing Results:   Date of testin2022  ROM 0-36 deg 0-42 on 22   Max Peak Torque 104    Min Peak Torque 42    Flex/Ext Ratio 2.4   % below normative data 66       Lumbar Range of Motion:     %   Flexion WFL  +increased hamstring tightness       Extension WFL      Left Side Bending WFL   Right Side Bending WFL   Left rotation    WFL   Right Rotation    WFL           Outcomes:  Initial score: 37%  Visit 5 score: 43% limited  Goal:      Treatment    Pt was instructed in and performed the following:     Royer received therapeutic exercises to develop/improved posture, cardiovascular endurance, muscular endurance, lumbar/cervical ROM, strength and " "muscular endurance for 57 minutes including the following exercises:     Treadmill 10 min     Standing ext, 5 sec holds x10  Sciatic Nerve Glides R only 10x (no symptoms)  SLR 2x10/ea   R QL Stretch 3x30"  Ext in lie 2x10, 3-5"   Slouch correct, x15  Bridges: 20 x 3" hold (+RTB c/ ER)  LTR, x10  DKTC, 10 sec holds x15   Hamstring stretch, 2x30" ea   Piriformis stretch, 2x30" ea  PPT, x20 (VCs for correct performance)      [facility] 3/8/2022   Visit Number 8   VAS Pain Rating 2   Treadmill Time (in min.) 10   Speed -   Lumbar Stretches - Slouch Overcorrection 10   Extension in Lying 20   Extension in Standing 10   Flexion in Lying 10   Lumbar Extension Seat Pad -   Femur Restraint -   Top Dead Center -   Counterweight -   Lumbar Flexion -   Lumbar Extension -   Lumbar Peak Torque -   Min Torque -   Test Percent Below Normative Data -   Lumbar Weight 53   Repetitions 20   Rating of Perceived Exertion 3   Ice - Z Lie (in min.) -         Peripheral muscle strengthening which included 1 set of 15-20 repetitions at a slow, controlled 10-13 second per rep pace focused on strengthening supporting musculature for improved body mechanics and functional mobility.  Pt and therapist focused on proper form during treatment to ensure optimal strengthening of each targeted muscle group.  Machines were utilized including torso rotation, leg extension, leg curl, chest press, upright row, tricep extension, bicep curl, leg press, and hip abduction    Not performed 3/8/2022 :    SL hip abduction x 15-          Education provided:   -Possibility of DOMS  -Continue HEP    Written Home Exercises Provided: Patient instructed to cont prior HEP.  Stretching: extensions in standing, ext in lie, hamstring stretch, knees to chest  Strengthening:  Cardio: started 2/17/22 with handouts given  Lifting: not yet started  Lumbar roll: he has to find and use    Exercises were reviewed and Royer was able to demonstrate them prior to the end " of the session.  Royer demonstrated good  understanding of the education provided.     See EMR under Patient Instructions for exercises provided prior visit.  Cardio added today    Assessment     Royer tolerated session well. Pt reported no radicular symptoms with sciatic nerve glide. Increased bridges with RTB and ER for increased glut activation without issue. Pt required VC for pelvic tilts that improved with demonstration. MedX was performed at 53ft lbs with 20 reps performed at an exertion rating of 3/10.          Patient is making good progress towards established goals.  Pt will continue to benefit from skilled outpatient physical therapy to address the deficits stated in the impairment chart, provide pt/family education and to maximize pt's level of independence in the home and community environment.     Anticipated Barriers for therapy: none  Pt's spiritual, cultural and educational needs considered and pt agreeable to plan of care and goals as stated below:     Goals:   Short Term Goals:  4 weeks  1. Report decreased in pain at worse less than  <   / =  8  /10  to increase tolerance for functional activities.On going  3. Increased core and bilateral LE MMT 1/2  to increase tolerance for ADL and work activities.On going  5. Pt to tolerate HEP to improve ROM and independence with ADL's.On going  6. Pt to improve range of motion by 25% to allow for improved functional mobility to allow for improvement in IADLs. On going    Long Term Goals: 8 weeks  1. Report decreased in pain at worse less than  <   / =  1  /10  to increase tolerance for functional mobility.  On going  3. Increased core and bilateral LE MMT 1 grade to increase tolerance for ADL and work activities.On going  4. Pt will report at 34% or less limitation on FOTO Lumbar spine survey  to demonstrate decrease in disability and improvement in back pain.On going  5. Pt to be Independent with HEP to improve ROM and independence with ADL's. On  going  7. Pt to demonstrate negative Bridge Test in order to show improved core strength for lumbar stabilization. On going  8. Pt to improve range of motion by 75% to allow for improved functional mobility to allow for improvement in IADLs. On going    Plan   Continue with established Plan of Care towards established PT goals.     Morris Giordano, PTA  03/08/2022

## 2022-03-09 NOTE — PROGRESS NOTES
"[facility]      Name: Royer Driscoll  Clinic Number: [id number]    Therapy Diagnosis:   Encounter Diagnoses   Name Primary?    Decreased functional activity tolerance Yes    Weakness of both lower extremities      Physician: Zulma Aj NP    Visit Date: 3/10/2022    Physician Orders: PT Eval and Treat   Medical Diagnosis from Referral: Low back pain [M54.50]  Evaluation Date: 2022  Authorization Period Expiration: 2022  Plan of Care Expiration: 2022  Visit # / Visits authorized:    Reassessment Due: 22     Precautions: Standard    Time In: 1000AM   Time Out: 1130AM  Total Billable Time: 90 minutes    Pattern of pain determined: Pattern 1 PEP    Subjective   Royer reports his back is doing okay today. Hasn't had issues with radicular symptoms lately. It only comes after prolonged sitting on a not so agreeable chair.     Patient reports tolerating previous visit with a little soreness.   Patient reports their pain to be 2/10 on a 0-10 scale with 0 being no pain and 10 being the worst pain imaginable.  Pain Location: low back     Occupation: Audio/video installation   Pts goals: "less pain and be able to sit for longer durations"     Objective     Baseline IM Testing Results:   Date of testin2022  ROM 0-36 deg 0-42 on 22   Max Peak Torque 104    Min Peak Torque 42    Flex/Ext Ratio 2.4   % below normative data 66       Lumbar Range of Motion:     %   Flexion WFL  +increased hamstring tightness       Extension WFL      Left Side Bending WFL   Right Side Bending WFL   Left rotation    WFL   Right Rotation    WFL           Outcomes:  Initial score: 37%  Visit 5 score: 43% limited  Goal:      Treatment    Pt was instructed in and performed the following:     Royer received therapeutic exercises to develop/improved posture, cardiovascular endurance, muscular endurance, lumbar/cervical ROM, strength and muscular endurance for 90 minutes " "including the following exercises:     Treadmill 10 min     +Freemotion Paloff Press 10# 20x/ea  +Freemotion march with Sandra hold 10# 20x/ea  +Hip Hinge w/ TrA Activation 10x  +Deadlift 15# KB w/ TrA Activation  Slouch correct, x15  SLR 3x10/ea   LTR, x10  Hamstring stretch, 3x30" ea   Piriformis stretch, 2x30" ea  PPT, x20 (VCs for correct performance)    [facility] 3/10/2022   Visit Number 9   VAS Pain Rating 2   Treadmill Time (in min.) 10   Speed -   Lumbar Stretches - Slouch Overcorrection 15   Extension in Lying -   Extension in Standing -   Flexion in Lying -   Lumbar Extension Seat Pad -   Femur Restraint -   Top Dead Center -   Counterweight -   Lumbar Flexion -   Lumbar Extension -   Lumbar Peak Torque -   Min Torque -   Test Percent Below Normative Data -   Lumbar Weight 57   Repetitions 15   Rating of Perceived Exertion 4   Ice - Z Lie (in min.) -         Peripheral muscle strengthening which included 1 set of 15-20 repetitions at a slow, controlled 10-13 second per rep pace focused on strengthening supporting musculature for improved body mechanics and functional mobility.  Pt and therapist focused on proper form during treatment to ensure optimal strengthening of each targeted muscle group.  Machines were utilized including torso rotation, leg extension, leg curl, chest press, upright row, tricep extension, bicep curl, leg press, and hip abduction    Not performed 3/10/2022 :    SL hip abduction x 15-  Standing ext, 5 sec holds x10  Sciatic Nerve Glides R only 10x (no symptoms)  R QL Stretch 3x30"  Ext in lie 2x10, 3-5"   Bridges: 20 x 3" hold (+RTB c/ ER)  DKTC, 10 sec holds x15         Education provided:   -Possibility of DOMS  -Continue HEP    Written Home Exercises Provided: Patient instructed to cont prior HEP.  Stretching: extensions in standing, ext in lie, hamstring stretch, knees to chest  Strengthening:  Cardio: started 2/17/22 with handouts given  Lifting: not yet started  Lumbar " roll: he has to find and use    Exercises were reviewed and Royer was able to demonstrate them prior to the end of the session.  Royer demonstrated good  understanding of the education provided.     See EMR under Patient Instructions for exercises provided prior visit.  Cardio added today    Assessment     Royer tolerated session well. Pt reports of radicular symptoms as of late.  Progressed core strengthening this date, and there were no adverse effects. Pt required VCs and demonstration throughout. MedX was increased to 57ft lbs with 15 reps performed at an exertion rating of 4/10.        Patient is making good progress towards established goals.  Pt will continue to benefit from skilled outpatient physical therapy to address the deficits stated in the impairment chart, provide pt/family education and to maximize pt's level of independence in the home and community environment.     Anticipated Barriers for therapy: none  Pt's spiritual, cultural and educational needs considered and pt agreeable to plan of care and goals as stated below:     Goals:   Short Term Goals:  4 weeks  1. Report decreased in pain at worse less than  <   / =  8  /10  to increase tolerance for functional activities.On going  3. Increased core and bilateral LE MMT 1/2  to increase tolerance for ADL and work activities.On going  5. Pt to tolerate HEP to improve ROM and independence with ADL's.On going  6. Pt to improve range of motion by 25% to allow for improved functional mobility to allow for improvement in IADLs. On going    Long Term Goals: 8 weeks  1. Report decreased in pain at worse less than  <   / =  1  /10  to increase tolerance for functional mobility.  On going  3. Increased core and bilateral LE MMT 1 grade to increase tolerance for ADL and work activities.On going  4. Pt will report at 34% or less limitation on FOTO Lumbar spine survey  to demonstrate decrease in disability and improvement in back pain.On going  5. Pt to be  Independent with HEP to improve ROM and independence with ADL's. On going  7. Pt to demonstrate negative Bridge Test in order to show improved core strength for lumbar stabilization. On going  8. Pt to improve range of motion by 75% to allow for improved functional mobility to allow for improvement in IADLs. On going    Plan   Continue with established Plan of Care towards established PT goals.     Rita Mcintyre, PTA  03/10/2022

## 2022-03-10 ENCOUNTER — CLINICAL SUPPORT (OUTPATIENT)
Dept: REHABILITATION | Facility: HOSPITAL | Age: 57
End: 2022-03-10
Payer: MEDICAID

## 2022-03-10 DIAGNOSIS — R29.898 WEAKNESS OF BOTH LOWER EXTREMITIES: ICD-10-CM

## 2022-03-10 DIAGNOSIS — R68.89 DECREASED FUNCTIONAL ACTIVITY TOLERANCE: Primary | ICD-10-CM

## 2022-03-10 PROCEDURE — 97110 THERAPEUTIC EXERCISES: CPT | Mod: PN,CQ

## 2022-03-15 ENCOUNTER — CLINICAL SUPPORT (OUTPATIENT)
Dept: REHABILITATION | Facility: HOSPITAL | Age: 57
End: 2022-03-15
Payer: MEDICAID

## 2022-03-15 DIAGNOSIS — R68.89 DECREASED FUNCTIONAL ACTIVITY TOLERANCE: Primary | ICD-10-CM

## 2022-03-15 DIAGNOSIS — R29.898 WEAKNESS OF BOTH LOWER EXTREMITIES: ICD-10-CM

## 2022-03-15 PROCEDURE — 97110 THERAPEUTIC EXERCISES: CPT | Mod: PN

## 2022-03-15 NOTE — PROGRESS NOTES
"[facility]      Name: Royer Driscoll  Clinic Number: [id number]    Therapy Diagnosis:   Encounter Diagnoses   Name Primary?    Decreased functional activity tolerance Yes    Weakness of both lower extremities      Physician: Zulma Aj NP    Visit Date: 3/15/2022    Physician Orders: PT Eval and Treat   Medical Diagnosis from Referral: Low back pain [M54.50]  Evaluation Date: 2022  Authorization Period Expiration: 2022  Plan of Care Expiration: 2022 to 22  Reassessment Due: 04/15/22  Visit # / Visits authorized: 10/13      Precautions: Standard    Time In: 1217  Time Out: 1307  Total Billable Time:  50 minutes    Pattern of pain determined: Pattern 1 PEP    Subjective   Royer reports that he may have strained/pulled something in his low back while helping his sister move furniture last Thursday. Says that back is feeling better overall, but will let me know if any pain is provoked throughout session. Does report that he feels as if therapy is improving his strength and function, however continues to experience significant stiffness.     Patient reports tolerating previous visit well but c/ a little soreness following.   Patient reports their pain to be 2/10 on a 0-10 scale with 0 being no pain and 10 being the worst pain imaginable.  Pain Location: R low back     Occupation: Audio/video installation   Pts goals: "less pain and be able to sit for longer durations"     Objective     Baseline IM Testing Results:   Date of testin2022  ROM 0-36 deg 0-42 on 22   Max Peak Torque 104    Min Peak Torque 42    Flex/Ext Ratio 2.4   % below normative data 66%     [facility] IM Testing Results:   Date of testin/15/22  ROM 0-42 deg   Max Peak Torque 142   Min Peak Torque 34   % below normative data 66%       Outcomes (FOTO):  Initial score: 37%  Visit 5 score: 43% limited  Visit 10 score: 41%  Goal: </= 34%      Treatment    Pt was instructed in " "and performed the following:     Royer received therapeutic exercises to develop/improved posture, cardiovascular endurance, muscular endurance, lumbar/cervical ROM, strength and muscular endurance for 40 minutes including the following exercises:     Treadmill (2.7mph), x10 min     [facility] 3/15/2022   Visit Number 10   VAS Pain Rating -   Treadmill Time (in min.) 10   Speed 2.7   Lumbar Stretches - Slouch Overcorrection -   Extension in Lying -   Extension in Standing -   Flexion in Lying -   Lumbar Extension Seat Pad 0   Femur Restraint 6   Top Dead Center 24   Counterweight 272   Lumbar Flexion 42   Lumbar Extension 0   Lumbar Peak Torque 142   Min Torque 34   Test Percent Below Normative Data 66   Test Percent Gain in Strength from Initial  25   Lumbar Weight -   Repetitions -   Rating of Perceived Exertion -   Ice - Z Lie (in min.) -     NV: seated thoracic extension over chair back, seated rollouts (for upper back), QD thoracic rotation    Peripheral muscle strengthening which included 1 set of 15-20 repetitions at a slow, controlled 10-13 second per rep pace focused on strengthening supporting musculature for improved body mechanics and functional mobility.  Pt and therapist focused on proper form during treatment to ensure optimal strengthening of each targeted muscle group.  Machines were utilized including torso rotation, leg extension, leg curl, chest press, upright row, tricep extension, bicep curl, leg press, and hip abduction      Not performed 3/15/2022 2/2 MedX reassessment:  +Freemotion Paloff Press 10# 20x/ea  +Freemotion march with Sandra hold 10# 20x/ea  +Hip Hinge w/ TrA Activation 10x  +Deadlift 15# KB w/ TrA Activation  Slouch correct, x15  SLR 3x10/ea   LTR, x10  Hamstring stretch, 3x30" ea   Piriformis stretch, 2x30" ea  PPT, x20 (VCs for correct performance)    Education provided:   - Rationale for POC and progressions  - MedX isometric testing: technique and resylts     Written " Home Exercises Provided: Patient instructed to cont prior HEP.  Stretching: extensions in standing, ext in lie, hamstring stretch, knees to chest  Strengthening:  Cardio: started 2/17/22 with handouts given  Lifting: not yet started  Lumbar roll: he has to find and use    Exercises were reviewed and Royer was able to demonstrate them prior to the end of the session.  Royer demonstrated good  understanding of the education provided.     See EMR under Patient Instructions for exercises provided prior visit.  Cardio added today    Assessment   Royer has attended 10 visits of [facility], which included MD evaluation, PT evaluation with isometric testing, and physical therapy tx including HEP instruction, education (including posture and ergonomics), aerobic work, dynamic strengthening on MedX equipment for the spine, and whole body strengthening on MedX equipment with increasing weight loads.  Pt  is demonstrating increased ability to reduce symptoms, improved posture, improved ROM, and improved strength as follows:    -Improved attention to posture  -Good compliance to HEP  -Improved 6 degrees of ROM,  initially 0-36 degrees upon MedX testing and currently 0-42 degrees  -Improved strength at each test point per MedX isometric testing with 25% (at 36 deg) and 8% (at 24 deg) average improvement and c/ reduced pain noted by pt       Patient is making fair progress towards established goals.  Pt will continue to benefit from skilled outpatient physical therapy to address the deficits stated in the impairment chart, provide pt/family education and to maximize pt's level of independence in the home and community environment. Further, therapy goal is to continue trunk mobility and stability progressions in functional postures (ie.standing and seated) pending tolerance. Pt would likely benefit from incorporation of thoracic mobility to address ongoing stiffness, deficits in overall mobility, and to  reduce further instability of lumbar spine.       Anticipated Barriers for therapy: none  Pt's spiritual, cultural and educational needs considered and pt agreeable to plan of care and goals as stated below:     Goals:   Short Term Goals:  4 weeks  1. Report decreased in pain at worse less than  </= 8/10  to increase tolerance for functional activities. GOAL MET 03/15/22  3. Increased core and bilateral LE MMT 1/2  to increase tolerance for ADL and work activities. GOAL MET 03/15/22  5. Pt to tolerate HEP to improve ROM and independence with ADL's. GOAL MET 03/15/22  6. Pt to improve range of motion by 25% to allow for improved functional mobility to allow for improvement in IADLs. (appropriate & ongoing)    Long Term Goals: 8 weeks  1. Report decreased in pain at worse less than  </= 1/10  to increase tolerance for functional mobility.  On going  3. Increased core and bilateral LE MMT 1 grade to increase tolerance for ADL and work activities.On going  4. Pt will report at 34% or less limitation on FOTO Lumbar spine survey  to demonstrate decrease in disability and improvement in back pain.On going  5. Pt to be Independent with HEP to improve ROM and independence with ADL's. On going  7. Pt to demonstrate negative Bridge Test in order to show improved core strength for lumbar stabilization. On going  8. Pt to improve range of motion by 75% to allow for improved functional mobility to allow for improvement in IADLs. On going    Plan   Continue with established Plan of Care towards established PT goals.     Deidre Moncada, PT, DPT  03/15/22

## 2022-03-15 NOTE — PLAN OF CARE
[facility]  Physical Therapy Plan of Care Note    Name: Royer Driscoll  Clinic Number: [id number]    Therapy Diagnosis:   Encounter Diagnoses   Name Primary?    Decreased functional activity tolerance Yes    Weakness of both lower extremities      Physician: Zulma Aj NP    Visit Date: 3/15/2022    Physician Orders: PT Eval and Treat   Medical Diagnosis from Referral: Low back pain [M54.50]  Evaluation Date: 2022  Authorization Period Expiration: 2022  Plan of Care Expiration: 2022 to 22  Reassessment Due: 04/15/22  Visit # / Visits authorized: 10/13     Precautions: Standard     Functional Level Prior to Evaluation:  Independent     SUBJECTIVE     Update: Royer reports that he may have strained/pulled something in his low back while helping his sister move furniture last Thursday. Says that back is feeling better overall, but will let me know if any pain is provoked throughout session. Does report that he feels as if therapy is improving his strength and function, however continues to experience significant stiffness.     OBJECTIVE     Update:     Baseline IM Testing Results:   Date of testin2022  ROM 0-36 deg 0-42 on 22   Max Peak Torque 104    Min Peak Torque 42    Flex/Ext Ratio 2.4   % below normative data 66%      Midpoint IM Testing Results:   Date of testin/15/22  ROM 0-42 deg   Max Peak Torque 142   Min Peak Torque 34   % below normative data 66%         Outcomes (FOTO):  Initial score: 37%  Visit 5 score: 43% limited  Visit 10 score: 41%  Goal: </= 34%      ASSESSMENT     Update:     Royer has attended 10 visits of [facility], which included MD evaluation, PT evaluation with isometric testing, and physical therapy tx including HEP instruction, education (including posture and ergonomics), aerobic work, dynamic strengthening on MedX equipment for the spine, and whole body strengthening on MedX equipment with  increasing weight loads.  Pt  is demonstrating increased ability to reduce symptoms, improved posture, improved ROM, and improved strength as follows:     -Improved attention to posture  -Good compliance to HEP  -Improved 6 degrees of ROM,  initially 0-36 degrees upon MedX testing and currently 0-42 degrees  -Improved strength at each test point per MedX isometric testing with 25% (at 36 deg) and 8% (at 24 deg) average improvement and c/ reduced pain noted by pt        Patient is making fair progress towards established goals.  Pt will continue to benefit from skilled outpatient physical therapy to address the deficits stated in the impairment chart, provide pt/family education and to maximize pt's level of independence in the home and community environment. Further, therapy goal is to continue trunk mobility and stability progressions in functional postures (ie.standing and seated) pending tolerance. Pt would likely benefit from incorporation of thoracic mobility to address     Goals: Ongoing and still appropriate.   Reasons for Recertification of Therapy:   Pt has completed only 10/20 visits of [facility] and will continue to benefit from PT services working towards set goals.       Goals:   Short Term Goals:  4 weeks  1. Report decreased in pain at worse less than  </= 8/10  to increase tolerance for functional activities. GOAL MET 03/15/22  3. Increased core and bilateral LE MMT 1/2  to increase tolerance for ADL and work activities. GOAL MET 03/15/22  5. Pt to tolerate HEP to improve ROM and independence with ADL's. GOAL MET 03/15/22  6. Pt to improve range of motion by 25% to allow for improved functional mobility to allow for improvement in IADLs. (appropriate & ongoing)     Long Term Goals: 8 weeks  1. Report decreased in pain at worse less than  </= 1/10  to increase tolerance for functional mobility.  On going  3. Increased core and bilateral LE MMT 1 grade to increase tolerance for ADL and work  activities.On going  4. Pt will report at 34% or less limitation on FOTO Lumbar spine survey  to demonstrate decrease in disability and improvement in back pain.On going  5. Pt to be Independent with HEP to improve ROM and independence with ADL's. On going  7. Pt to demonstrate negative Bridge Test in order to show improved core strength for lumbar stabilization. On going  8. Pt to improve range of motion by 75% to allow for improved functional mobility to allow for improvement in IADLs. On going    PLAN     Updated Certification Period: 03/17/22 to 05/17/22  Recommended Treatment Plan: 2 times per week for 5 weeks:  Cervical/Lumbar Traction, Manual Therapy, Neuromuscular Re-ed, Patient Education, Therapeutic Activities and Therapeutic Exercise  Other Recommendations: incorporation of thoracic mobility training and continued higher level stability training (lumbopelvic)    Deidre Moncada, PT, DPT    I CERTIFY THE NEED FOR THESE SERVICES FURNISHED UNDER THIS PLAN OF TREATMENT AND WHILE UNDER MY CARE  Physician's comments:      Physician's Signature: ___________________________________________________

## 2022-03-16 NOTE — PROGRESS NOTES
"[facility] Treatment      Name: Royer Driscoll  Clinic Number: [id number]    Therapy Diagnosis:   Encounter Diagnoses   Name Primary?    Decreased functional activity tolerance Yes    Weakness of both lower extremities      Physician: Zulma Aj NP    Visit Date: 3/17/2022    Physician Orders: PT Eval and Treat   Medical Diagnosis from Referral: Low back pain [M54.50]  Evaluation Date: 2022  Authorization Period Expiration: 2022  Plan of Care Expiration: 2022 to 22  Reassessment Due: 04/15/22  Visit # / Visits authorized: 10/13      Precautions: Standard    Time In: 9:15 AM  Time Out: 10:25   Total Billable Time:  47 minutes    Pattern of pain determined: Pattern 1 PEP    Subjective   Royer reports he is doing alright. He doesn't have pain really but he always has stiffness in his low back. He reports he has been doing his exercises.     Patient reports tolerating previous visit well with some soreness.   Patient reports their pain to be 2/10 on a 0-10 scale with 0 being no pain and 10 being the worst pain imaginable.  Pain Location: R low back     Occupation: Audio/video installation   Pts goals: "less pain and be able to sit for longer durations"     Objective     Baseline IM Testing Results:   Date of testin2022  ROM 0-36 deg 0-42 on 22   Max Peak Torque 104    Min Peak Torque 42    Flex/Ext Ratio 2.4   % below normative data 66%     Midpoint IM Testing Results:   Date of testin/15/22  ROM 0-42 deg   Max Peak Torque 142   Min Peak Torque 34   % below normative data 66%       Outcomes (FOTO):  Initial score: 37%  Visit 5 score: 43% limited  Visit 10 score: 41%  Goal: </= 34%      Treatment    Pt was instructed in and performed the following:     Royer received therapeutic exercises to develop/improved posture, cardiovascular endurance, muscular endurance, lumbar/cervical ROM, strength and muscular endurance for 70 minutes including the " "following exercises:     Treadmill (2.7mph), x10 min     +Thoracic extension over chair 10 x10"  +seated rollout  10 x 10"  +SL open books 10 ea   +Cat/Cow x 10   Freemotion Paloff Press 10# 20x/ea  Freemotion march with Sandra hold 10# 20 x 2  Hip Hinge w/ TrA Activation 10x  Deadlift 15# KB w/ TrA Activation x 12     [facility] 3/17/2022   Visit Number 11   VAS Pain Rating 2   Treadmill Time (in min.) 10   Speed 2.7   Lumbar Stretches - Slouch Overcorrection -   Extension in Lying -   Extension in Standing -   Flexion in Lying -   Flexion in Sitting 10   Lumbar Extension Seat Pad -   Femur Restraint -   Top Dead Center -   Counterweight -   Lumbar Flexion -   Lumbar Extension -   Lumbar Peak Torque -   Min Torque -   Test Percent Below Normative Data -   Test Percent Gain in Strength from Initial  -   Lumbar Weight 57   Repetitions 18   Rating of Perceived Exertion 5   Ice - Z Lie (in min.) -         Peripheral muscle strengthening which included 1 set of 15-20 repetitions at a slow, controlled 10-13 second per rep pace focused on strengthening supporting musculature for improved body mechanics and functional mobility.  Pt and therapist focused on proper form during treatment to ensure optimal strengthening of each targeted muscle group.  Machines were utilized including torso rotation, leg extension, leg curl, chest press, upright row, tricep extension, bicep curl, leg press, and hip abduction      Not performed 3/15/2022 2/2 MedX reassessment:    Slouch correct, x15  SLR 3x10/ea   LTR, x10  Hamstring stretch, 3x30" ea   Piriformis stretch, 2x30" ea  PPT, x20 (VCs for correct performance)    Education provided:     Written Home Exercises Provided: Patient instructed to cont prior HEP.  Stretching: extensions in standing, ext in lie, hamstring stretch, knees to chest  Strengthening:  Cardio: started 2/17/22 with handouts given  Lifting: not yet started  Lumbar roll: he has to find and use    Exercises were " reviewed and Royer was able to demonstrate them prior to the end of the session.  Royer demonstrated good  understanding of the education provided.     See EMR under Patient Instructions for exercises provided prior visit.  Cardio added today    Assessment   Royer tolerated session well. He continues to report to session with low levels of pain but continued stiffness. Added thoracic mobility to help decrease reported stiffness. Pt has limited thoracic mobility with open book with pt unable to rotate at upper back without compensatory hip rotation. Heavy VC and tactile cues for deadlift.  MedX was performed at 57ft lbs with 18 reps performed at an exertion rating of 5/10.         Patient is making fair progress towards established goals.  Pt will continue to benefit from skilled outpatient physical therapy to address the deficits stated in the impairment chart, provide pt/family education and to maximize pt's level of independence in the home and community environment. Further, therapy goal is to continue trunk mobility and stability progressions in functional postures (ie.standing and seated) pending tolerance. Pt would likely benefit from incorporation of thoracic mobility to address ongoing stiffness, deficits in overall mobility, and to reduce further instability of lumbar spine.       Anticipated Barriers for therapy: none  Pt's spiritual, cultural and educational needs considered and pt agreeable to plan of care and goals as stated below:     Goals:   Short Term Goals:  4 weeks  1. Report decreased in pain at worse less than  </= 8/10  to increase tolerance for functional activities. GOAL MET 03/15/22  3. Increased core and bilateral LE MMT 1/2  to increase tolerance for ADL and work activities. GOAL MET 03/15/22  5. Pt to tolerate HEP to improve ROM and independence with ADL's. GOAL MET 03/15/22  6. Pt to improve range of motion by 25% to allow for improved functional mobility to allow for improvement in  IADLs. (appropriate & ongoing)    Long Term Goals: 8 weeks  1. Report decreased in pain at worse less than  </= 1/10  to increase tolerance for functional mobility.  On going  3. Increased core and bilateral LE MMT 1 grade to increase tolerance for ADL and work activities.On going  4. Pt will report at 34% or less limitation on FOTO Lumbar spine survey  to demonstrate decrease in disability and improvement in back pain.On going  5. Pt to be Independent with HEP to improve ROM and independence with ADL's. On going  7. Pt to demonstrate negative Bridge Test in order to show improved core strength for lumbar stabilization. On going  8. Pt to improve range of motion by 75% to allow for improved functional mobility to allow for improvement in IADLs. On going    Plan   Continue with established Plan of Care towards established PT goals.     Morris Giordano, KEYUR,  03/17/2022

## 2022-03-17 ENCOUNTER — CLINICAL SUPPORT (OUTPATIENT)
Dept: REHABILITATION | Facility: HOSPITAL | Age: 57
End: 2022-03-17
Payer: MEDICAID

## 2022-03-17 DIAGNOSIS — R68.89 DECREASED FUNCTIONAL ACTIVITY TOLERANCE: Primary | ICD-10-CM

## 2022-03-17 DIAGNOSIS — R29.898 WEAKNESS OF BOTH LOWER EXTREMITIES: ICD-10-CM

## 2022-03-17 PROCEDURE — 97110 THERAPEUTIC EXERCISES: CPT | Mod: PN,CQ

## 2022-03-21 ENCOUNTER — HOSPITAL ENCOUNTER (OUTPATIENT)
Dept: RADIOLOGY | Facility: HOSPITAL | Age: 57
Discharge: HOME | End: 2022-03-21
Attending: NURSE PRACTITIONER
Payer: MEDICAID

## 2022-03-21 DIAGNOSIS — M54.2 CERVICALGIA: ICD-10-CM

## 2022-03-21 DIAGNOSIS — M54.2 CERVICALGIA: Primary | ICD-10-CM

## 2022-03-21 PROCEDURE — 72040 X-RAY EXAM NECK SPINE 2-3 VW: CPT | Mod: TC,FY

## 2022-03-21 PROCEDURE — 72040 XR CERVICAL SPINE AP LATERAL: ICD-10-PCS | Mod: 26,,, | Performed by: RADIOLOGY

## 2022-03-21 PROCEDURE — 72040 X-RAY EXAM NECK SPINE 2-3 VW: CPT | Mod: 26,,, | Performed by: RADIOLOGY

## 2022-03-21 NOTE — PROGRESS NOTES
"[facility]      Name: Royer Driscoll  Clinic Number: [id number]    Therapy Diagnosis:   Encounter Diagnoses   Name Primary?    Decreased functional activity tolerance Yes    Weakness of both lower extremities      Physician: Zulma Aj NP    Visit Date: 3/22/2022    Physician Orders: PT Eval and Treat   Medical Diagnosis from Referral: Low back pain [M54.50]  Evaluation Date: 2022  Authorization Period Expiration: 2022  Plan of Care Expiration: 2022 to 22  Reassessment Due: 04/15/22  Visit # / Visits authorized:       Precautions: Standard    Time In: 1000AM  Time Out: 1053 AM  Total Billable Time:  40 minutes    Pattern of pain determined: Pattern 1 PEP    Subjective   Royer reports he is doing well. No stiffness to report.      Patient reports tolerating previous visit well with some soreness.   Patient reports their pain to be 0/10 on a 0-10 scale with 0 being no pain and 10 being the worst pain imaginable.  Pain Location: R low back     Occupation: Audio/video installation   Pts goals: "less pain and be able to sit for longer durations"     Objective     Baseline IM Testing Results:   Date of testin2022  ROM 0-36 deg 0-42 on 22   Max Peak Torque 104    Min Peak Torque 42    Flex/Ext Ratio 2.4   % below normative data 66%     Midpoint IM Testing Results:   Date of testin/15/22  ROM 0-42 deg   Max Peak Torque 142   Min Peak Torque 34   % below normative data 66%       Outcomes (FOTO):  Initial score: 37%  Visit 5 score: 43% limited  Visit 10 score: 41%  Goal: </= 34%      Treatment    Pt was instructed in and performed the following:     Royer received therapeutic exercises to develop/improved posture, cardiovascular endurance, muscular endurance, lumbar/cervical ROM, strength and muscular endurance for 53 minutes including the following exercises:     Treadmill (2.7mph), x10 min     Thoracic extension over chair 10 " "x10"  seated rollout  10 x 10"  SL open books 10 ea   Cat/Cow x 10   Freemotion Paloff Press 10# 20x/ea  Freemotion march with Sandra hold 10# 20 x 2  Hip Hinge w/ TrA Activation 10x2  Deadlift 15# KB w/ TrA Activation 2 x 10    [facility] 3/22/2022   Visit Number 12   VAS Pain Rating 0   Treadmill Time (in min.) 10   Speed 2.7   Lumbar Stretches - Slouch Overcorrection -   Extension in Lying -   Extension in Standing -   Flexion in Lying -   Flexion in Sitting 10   Lumbar Extension Seat Pad -   Femur Restraint -   Top Dead Center -   Counterweight -   Lumbar Flexion -   Lumbar Extension -   Lumbar Peak Torque -   Min Torque -   Test Percent Below Normative Data -   Test Percent Gain in Strength from Initial  -   Lumbar Weight 57   Repetitions 20   Rating of Perceived Exertion 4   Ice - Z Lie (in min.) -         Peripheral muscle strengthening which included 1 set of 15-20 repetitions at a slow, controlled 10-13 second per rep pace focused on strengthening supporting musculature for improved body mechanics and functional mobility.  Pt and therapist focused on proper form during treatment to ensure optimal strengthening of each targeted muscle group.  Machines were utilized including torso rotation, leg extension, leg curl, chest press, upright row, tricep extension, bicep curl, leg press, and hip abduction      Not performed 3/15/2022 :    Slouch correct, x15  SLR 3x10/ea   LTR, x10  Hamstring stretch, 3x30" ea   Piriformis stretch, 2x30" ea  PPT, x20 (VCs for correct performance)    Education provided:     Written Home Exercises Provided: Patient instructed to cont prior HEP.  Stretching: extensions in standing, ext in lie, hamstring stretch, knees to chest  Strengthening:  Cardio: started 2/17/22 with handouts given  Lifting: not yet started  Lumbar roll: he has to find and use    Exercises were reviewed and Royer was able to demonstrate them prior to the end of the session.  Royer demonstrated good  " understanding of the education provided.     See EMR under Patient Instructions for exercises provided prior visit.  Cardio added today    Assessment   Edward reports to session without c/o pain or stiffness. Continued previously prescribed exercises with increased repetitions with hip hinge exercises without exacerbation. Pt continues to have thoracic mobility restrictions with open books. MedX was performed at 57ft lbs with 20 reps performed at an exertion rating of 4/10.         Patient is making fair progress towards established goals.  Pt will continue to benefit from skilled outpatient physical therapy to address the deficits stated in the impairment chart, provide pt/family education and to maximize pt's level of independence in the home and community environment. Further, therapy goal is to continue trunk mobility and stability progressions in functional postures (ie.standing and seated) pending tolerance. Pt would likely benefit from incorporation of thoracic mobility to address ongoing stiffness, deficits in overall mobility, and to reduce further instability of lumbar spine.       Anticipated Barriers for therapy: none  Pt's spiritual, cultural and educational needs considered and pt agreeable to plan of care and goals as stated below:     Goals:   Short Term Goals:  4 weeks  1. Report decreased in pain at worse less than  </= 8/10  to increase tolerance for functional activities. GOAL MET 03/15/22  3. Increased core and bilateral LE MMT 1/2  to increase tolerance for ADL and work activities. GOAL MET 03/15/22  5. Pt to tolerate HEP to improve ROM and independence with ADL's. GOAL MET 03/15/22  6. Pt to improve range of motion by 25% to allow for improved functional mobility to allow for improvement in IADLs. (appropriate & ongoing)    Long Term Goals: 8 weeks  1. Report decreased in pain at worse less than  </= 1/10  to increase tolerance for functional mobility.  On going  3. Increased core and  bilateral LE MMT 1 grade to increase tolerance for ADL and work activities.On going  4. Pt will report at 34% or less limitation on FOTO Lumbar spine survey  to demonstrate decrease in disability and improvement in back pain.On going  5. Pt to be Independent with HEP to improve ROM and independence with ADL's. On going  7. Pt to demonstrate negative Bridge Test in order to show improved core strength for lumbar stabilization. On going  8. Pt to improve range of motion by 75% to allow for improved functional mobility to allow for improvement in IADLs. On going    Plan   Continue with established Plan of Care towards established PT goals.     Morris Giordano, PTA,  03/21/2022

## 2022-03-22 ENCOUNTER — CLINICAL SUPPORT (OUTPATIENT)
Dept: REHABILITATION | Facility: HOSPITAL | Age: 57
End: 2022-03-22
Payer: MEDICAID

## 2022-03-22 DIAGNOSIS — R68.89 DECREASED FUNCTIONAL ACTIVITY TOLERANCE: Primary | ICD-10-CM

## 2022-03-22 DIAGNOSIS — R29.898 WEAKNESS OF BOTH LOWER EXTREMITIES: ICD-10-CM

## 2022-03-22 PROCEDURE — 97110 THERAPEUTIC EXERCISES: CPT | Mod: PN,CQ

## 2022-03-22 NOTE — PROGRESS NOTES
"[facility]      Name: Royer Driscoll  Clinic Number: [id number]    Therapy Diagnosis:   Encounter Diagnoses   Name Primary?    Decreased functional activity tolerance Yes    Weakness of both lower extremities      Physician: Zulma Aj NP    Visit Date: 3/24/2022    Physician Orders: PT Eval and Treat   Medical Diagnosis from Referral: Low back pain [M54.50]  Evaluation Date: 2022  Authorization Period Expiration: 2022  Plan of Care Expiration: 2022 to 22  Reassessment Due: 04/15/22  Visit # / Visits authorized: 3/25 ( old auth?)     Precautions: Standard    Time In: 1000AM  Time Out: 1110AM  Total Billable Time:  70 minutes    Pattern of pain determined: Pattern 1 PEP    Subjective   Royer reports tension and tightness in lower back. He's also having a little neck pain today as well.     Patient reports tolerating previous visit well with some soreness.   Patient reports their pain to be 2/10 on a 0-10 scale with 0 being no pain and 10 being the worst pain imaginable.  Pain Location: R low back     Occupation: Audio/video installation   Pts goals: "less pain and be able to sit for longer durations"     Objective     Baseline IM Testing Results:   Date of testin2022  ROM 0-36 deg 0-42 on 22   Max Peak Torque 104    Min Peak Torque 42    Flex/Ext Ratio 2.4   % below normative data 66%     Midpoint IM Testing Results:   Date of testin/15/22  ROM 0-42 deg   Max Peak Torque 142   Min Peak Torque 34   % below normative data 66%       Outcomes (FOTO):  Initial score: 37%  Visit 5 score: 43% limited  Visit 10 score: 41%  Goal: </= 34%      Treatment    Pt was instructed in and performed the following:     Royer received therapeutic exercises to develop/improved posture, cardiovascular endurance, muscular endurance, lumbar/cervical ROM, strength and muscular endurance for 70 minutes including the following exercises:     Treadmill " "(2.7mph), x10 min   Freemotion march with Sandra hold 13# 2x20  Freemotion Paloff Press 13# 20x/ea  Deadlift 20# KB w/ TrA Activation 2 x 10  Hip Hinge w/ TrA Activation 10x2  Cat/Cow x 10   SL open books w/ RTB 10x ea  +DKTC 5x15'  LTR, x10  +R QL Stretch 2x45"      [facility] 3/24/2022   Visit Number 13   VAS Pain Rating 2   Treadmill Time (in min.) 10   Speed 2.7   Lumbar Stretches - Slouch Overcorrection -   Extension in Lying -   Extension in Standing -   Flexion in Lying 5   Flexion in Sitting -   Lumbar Extension Seat Pad -   Femur Restraint -   Top Dead Center -   Counterweight -   Lumbar Flexion -   Lumbar Extension -   Lumbar Peak Torque -   Min Torque -   Test Percent Below Normative Data -   Test Percent Gain in Strength from Initial  -   Lumbar Weight 63   Repetitions 15   Rating of Perceived Exertion 3   Ice - Z Lie (in min.) -       Peripheral muscle strengthening which included 1 set of 15-20 repetitions at a slow, controlled 10-13 second per rep pace focused on strengthening supporting musculature for improved body mechanics and functional mobility.  Pt and therapist focused on proper form during treatment to ensure optimal strengthening of each targeted muscle group.  Machines were utilized including torso rotation, leg extension, leg curl, chest press, upright row, tricep extension, bicep curl, leg press, and hip abduction      Not performed 3/15/2022 :    Slouch correct, x15  SLR 3x10/ea   Hamstring stretch, 3x30" ea   Piriformis stretch, 2x30" ea  PPT, x20 (VCs for correct performance)  Thoracic extension over chair 10 x10"  Seated rollout  10 x 10"  Education provided:     Written Home Exercises Provided: Patient instructed to cont prior HEP.  Stretching: extensions in standing, ext in lie, hamstring stretch, knees to chest  Strengthening:  Cardio: started 2/17/22 with handouts given  Lifting: not yet started  Lumbar roll: he has to find and use    Exercises were reviewed and Royer was " able to demonstrate them prior to the end of the session.  Royer demonstrated good  understanding of the education provided.     See EMR under Patient Instructions for exercises provided prior visit.  Cardio added today    Assessment   Royer tolerated the above tx well with minimal c/o pain. Pt presents with reports of tension and tightness in low back. Continued with therex from previous session with increase weight, and there were no adverse effects. Added QL stretch for reduction of right sided tightness, and pt reported a good response following. MedX was performed at 63ft lbs with 15 reps performed at an exertion rating of 3/10.       Patient is making fair progress towards established goals.  Pt will continue to benefit from skilled outpatient physical therapy to address the deficits stated in the impairment chart, provide pt/family education and to maximize pt's level of independence in the home and community environment. Further, therapy goal is to continue trunk mobility and stability progressions in functional postures (ie.standing and seated) pending tolerance. Pt would likely benefit from incorporation of thoracic mobility to address ongoing stiffness, deficits in overall mobility, and to reduce further instability of lumbar spine.       Anticipated Barriers for therapy: none  Pt's spiritual, cultural and educational needs considered and pt agreeable to plan of care and goals as stated below:     Goals:   Short Term Goals:  4 weeks  1. Report decreased in pain at worse less than  </= 8/10  to increase tolerance for functional activities. GOAL MET 03/15/22  3. Increased core and bilateral LE MMT 1/2  to increase tolerance for ADL and work activities. GOAL MET 03/15/22  5. Pt to tolerate HEP to improve ROM and independence with ADL's. GOAL MET 03/15/22  6. Pt to improve range of motion by 25% to allow for improved functional mobility to allow for improvement in IADLs. (appropriate & ongoing)    Long  Term Goals: 8 weeks  1. Report decreased in pain at worse less than  </= 1/10  to increase tolerance for functional mobility.  On going  3. Increased core and bilateral LE MMT 1 grade to increase tolerance for ADL and work activities.On going  4. Pt will report at 34% or less limitation on FOTO Lumbar spine survey  to demonstrate decrease in disability and improvement in back pain.On going  5. Pt to be Independent with HEP to improve ROM and independence with ADL's. On going  7. Pt to demonstrate negative Bridge Test in order to show improved core strength for lumbar stabilization. On going  8. Pt to improve range of motion by 75% to allow for improved functional mobility to allow for improvement in IADLs. On going    Plan   Continue with established Plan of Care towards established PT goals.     Rita Mcintyre, PTA,  03/24/2022

## 2022-03-24 ENCOUNTER — CLINICAL SUPPORT (OUTPATIENT)
Dept: REHABILITATION | Facility: HOSPITAL | Age: 57
End: 2022-03-24
Payer: MEDICAID

## 2022-03-24 DIAGNOSIS — R29.898 WEAKNESS OF BOTH LOWER EXTREMITIES: ICD-10-CM

## 2022-03-24 DIAGNOSIS — R68.89 DECREASED FUNCTIONAL ACTIVITY TOLERANCE: Primary | ICD-10-CM

## 2022-03-24 PROCEDURE — 97110 THERAPEUTIC EXERCISES: CPT | Mod: PN,CQ

## 2022-03-29 ENCOUNTER — CLINICAL SUPPORT (OUTPATIENT)
Dept: REHABILITATION | Facility: HOSPITAL | Age: 57
End: 2022-03-29
Payer: MEDICAID

## 2022-03-29 DIAGNOSIS — R29.898 WEAKNESS OF BOTH LOWER EXTREMITIES: ICD-10-CM

## 2022-03-29 DIAGNOSIS — R68.89 DECREASED FUNCTIONAL ACTIVITY TOLERANCE: Primary | ICD-10-CM

## 2022-03-29 PROCEDURE — 97110 THERAPEUTIC EXERCISES: CPT | Mod: PN,CQ

## 2022-03-29 NOTE — PROGRESS NOTES
"[facility]      Name: Royer Driscoll  Clinic Number: [id number]    Therapy Diagnosis:   Encounter Diagnoses   Name Primary?    Decreased functional activity tolerance Yes    Weakness of both lower extremities      Physician: Zulma Aj NP    Visit Date: 3/29/2022    Physician Orders: PT Eval and Treat   Medical Diagnosis from Referral: Low back pain [M54.50]  Evaluation Date: 2022  Authorization Period Expiration: 2022  Plan of Care Expiration: 2022 to 22  Reassessment Due: 04/15/22  Visit # / Visits authorized:  ( old auth?)     Precautions: Standard    Time In: 1000AM  Time Out: 1055AM  Total Billable Time:  48 minutes    Pattern of pain determined: Pattern 1 PEP    Subjective   Royer reports his back has been alright. He is having another issue now with his neck. He is going to have an MRI soon on it. His back is a little better but certain movements still bother it. Some days are better than others.     Patient reports tolerating previous visit well with some soreness.   Patient reports their pain to be 2/10 on a 0-10 scale with 0 being no pain and 10 being the worst pain imaginable.  Pain Location: R low back     Occupation: Audio/video installation   Pts goals: "less pain and be able to sit for longer durations"     Objective     Baseline IM Testing Results:   Date of testin2022  ROM 0-36 deg 0-42 on 22   Max Peak Torque 104    Min Peak Torque 42    Flex/Ext Ratio 2.4   % below normative data 66%     Midpoint IM Testing Results:   Date of testin/15/22  ROM 0-42 deg   Max Peak Torque 142   Min Peak Torque 34   % below normative data 66%       Outcomes (FOTO):  Initial score: 37%  Visit 5 score: 43% limited  Visit 10 score: 41%  Goal: </= 34%      Treatment    Pt was instructed in and performed the following:     Royer received therapeutic exercises to develop/improved posture, cardiovascular endurance, muscular " "endurance, lumbar/cervical ROM, strength and muscular endurance for 55 minutes including the following exercises:     Treadmill (2.7mph), x10 min     Freemotion march with Sandra hold 13# 2x20  Freemotion Paloff Press 13# 20x/ea  Hip Hinge w/ TrA Activation 10x2 (c/ PVC)  Deadlift 25# KB w/ TrA Activation 2 x 10  Cat/Cow x 10   SL open books w/ RTB 10x ea  DKTC 5x15'  LTR, x10  R QL Stretch 2x45"    [facility] 3/29/2022   Visit Number 14   VAS Pain Rating 2   Treadmill Time (in min.) 10   Speed 2.7   Lumbar Stretches - Slouch Overcorrection -   Extension in Lying -   Extension in Standing -   Flexion in Lying 5   Flexion in Sitting -   Lumbar Extension Seat Pad -   Femur Restraint -   Top Dead Center -   Counterweight -   Lumbar Flexion -   Lumbar Extension -   Lumbar Peak Torque -   Min Torque -   Test Percent Below Normative Data -   Test Percent Gain in Strength from Initial  -   Lumbar Weight 63   Repetitions 18   Rating of Perceived Exertion 4   Ice - Z Lie (in min.) -         Peripheral muscle strengthening which included 1 set of 15-20 repetitions at a slow, controlled 10-13 second per rep pace focused on strengthening supporting musculature for improved body mechanics and functional mobility.  Pt and therapist focused on proper form during treatment to ensure optimal strengthening of each targeted muscle group.  Machines were utilized including torso rotation, leg extension, leg curl, chest press, upright row, tricep extension, bicep curl, leg press, and hip abduction      Not performed 3/15/2022 :    Slouch correct, x15  SLR 3x10/ea   Hamstring stretch, 3x30" ea   Piriformis stretch, 2x30" ea  PPT, x20 (VCs for correct performance)  Thoracic extension over chair 10 x10"  Seated rollout  10 x 10"  Education provided:     Written Home Exercises Provided: Patient instructed to cont prior HEP.  Stretching: extensions in standing, ext in lie, hamstring stretch, knees to chest  Strengthening:  Cardio: " started 2/17/22 with handouts given  Lifting: not yet started  Lumbar roll: he has to find and use    Exercises were reviewed and Royer was able to demonstrate them prior to the end of the session.  Royer demonstrated good  understanding of the education provided.     See EMR under Patient Instructions for exercises provided prior visit.  Cardio added today    Assessment   Royer continues to tolerate sessions well. Pt demonstrates limited spinal mobility during cat/cow. Pt contuinues to need verbal and tactile cues during hip hinge performance though good performance with RDL with KB. MedX was performed at 63ft lbs with 18 reps performed at an exertion rating of 4/10.         Patient is making fair progress towards established goals.  Pt will continue to benefit from skilled outpatient physical therapy to address the deficits stated in the impairment chart, provide pt/family education and to maximize pt's level of independence in the home and community environment. Further, therapy goal is to continue trunk mobility and stability progressions in functional postures (ie.standing and seated) pending tolerance. Pt would likely benefit from incorporation of thoracic mobility to address ongoing stiffness, deficits in overall mobility, and to reduce further instability of lumbar spine.       Anticipated Barriers for therapy: none  Pt's spiritual, cultural and educational needs considered and pt agreeable to plan of care and goals as stated below:     Goals:   Short Term Goals:  4 weeks  1. Report decreased in pain at worse less than  </= 8/10  to increase tolerance for functional activities. GOAL MET 03/15/22  3. Increased core and bilateral LE MMT 1/2  to increase tolerance for ADL and work activities. GOAL MET 03/15/22  5. Pt to tolerate HEP to improve ROM and independence with ADL's. GOAL MET 03/15/22  6. Pt to improve range of motion by 25% to allow for improved functional mobility to allow for improvement in  IADLs. (appropriate & ongoing)    Long Term Goals: 8 weeks  1. Report decreased in pain at worse less than  </= 1/10  to increase tolerance for functional mobility.  On going  3. Increased core and bilateral LE MMT 1 grade to increase tolerance for ADL and work activities.On going  4. Pt will report at 34% or less limitation on FOTO Lumbar spine survey  to demonstrate decrease in disability and improvement in back pain.On going  5. Pt to be Independent with HEP to improve ROM and independence with ADL's. On going  7. Pt to demonstrate negative Bridge Test in order to show improved core strength for lumbar stabilization. On going  8. Pt to improve range of motion by 75% to allow for improved functional mobility to allow for improvement in IADLs. On going    Plan   Continue with established Plan of Care towards established PT goals.     Morris Giordano, PTA,  03/29/2022

## 2022-03-31 ENCOUNTER — CLINICAL SUPPORT (OUTPATIENT)
Dept: REHABILITATION | Facility: HOSPITAL | Age: 57
End: 2022-03-31
Payer: MEDICAID

## 2022-03-31 DIAGNOSIS — R29.898 WEAKNESS OF BOTH LOWER EXTREMITIES: ICD-10-CM

## 2022-03-31 DIAGNOSIS — R68.89 DECREASED FUNCTIONAL ACTIVITY TOLERANCE: Primary | ICD-10-CM

## 2022-03-31 PROCEDURE — 97110 THERAPEUTIC EXERCISES: CPT | Mod: PN | Performed by: PHYSICAL MEDICINE & REHABILITATION

## 2022-03-31 NOTE — PROGRESS NOTES
"[facility]      Name: Royer Driscoll  Clinic Number: [id number]    Therapy Diagnosis:   Encounter Diagnoses   Name Primary?    Decreased functional activity tolerance Yes    Weakness of both lower extremities      Physician: Zulma Aj NP    Visit Date: 3/31/2022    Physician Orders: PT Eval and Treat   Medical Diagnosis from Referral: Low back pain [M54.50]  Evaluation Date: 2022  Authorization Period Expiration: 2022  Plan of Care Expiration: 2022 to 22  Reassessment Due: 04/15/22  Visit # / Visits authorized: 15/25      Precautions: Standard    Time In: 1045AM  Time Out: 1150AM  Total Billable Time:  61 minutes    Pattern of pain determined: Pattern 1 PEP    Subjective   Royer reports his back has been alright. He is having issueswith his neck. He is going to have an MRI soon on it. His back is a little better but certain movements still bother it. Some days are better than others. He has 2/10 back pain today.  Slightly better after stretching.  He does his exercises daily.      Patient reports tolerating previous visit well with some soreness.   Patient reports their pain to be 2/10 on a 0-10 scale with 0 being no pain and 10 being the worst pain imaginable.  Pain Location: R low back     Occupation: Audio/video installation   Pts goals: "less pain and be able to sit for longer durations"     Objective     Baseline IM Testing Results:   Date of testin2022  ROM 0-36 deg 0-42 on 22   Max Peak Torque 104    Min Peak Torque 42    Flex/Ext Ratio 2.4   % below normative data 66%     Midpoint IM Testing Results:   Date of testin/15/22  ROM 0-42 deg   Max Peak Torque 142   Min Peak Torque 34   % below normative data 66%       Outcomes (FOTO):  Initial score: 37%  Visit 5 score: 43% limited  Visit 10 score: 41%  Goal: </= 34%      Treatment    Pt was instructed in and performed the following:     Royer received therapeutic exercises " "to develop/improved posture, cardiovascular endurance, muscular endurance, lumbar/cervical ROM, strength and muscular endurance for 61 minutes including the following exercises:     Treadmill (2.7mph), x10 min     Mild back pain  Ext in standing no change  Ext in lie 10 reps X 2 with slightly reduced feeling of stiffness, and hands placed to encourage thoracic extension as well as lumbar stretch (further up by head)  Extensions reviewed and reminded this as part of HEP      Golfer's lift taught and modified single dead lift with TrA Activation 10 reps 10#   Deadlift 25# KB w/ TrA Activation 2 x 10  Cat/Cow x 10   SL open books w/ RTB 10x ea  DKTC 5x15'  LTR, x10  R QL Stretch 2x45"  Freemotion march with Sandra hold 13# 2x20  Freemotion Paloff Press 13# 20x/ea      [facility] 3/31/2022   Visit Number 15   VAS Pain Rating 2   Treadmill Time (in min.) 10   Speed 2.7   Lumbar Stretches - Slouch Overcorrection -   Extension in Lying 10   Extension in Standing 10   Flexion in Lying 10   Flexion in Sitting -   Lumbar Extension Seat Pad -   Femur Restraint -   Top Dead Center -   Counterweight -   Lumbar Flexion -   Lumbar Extension -   Lumbar Peak Torque -   Min Torque -   Test Percent Below Normative Data -   Test Percent Gain in Strength from Initial  -   Lumbar Weight 63   Repetitions 20   Rating of Perceived Exertion 5   Ice - Z Lie (in min.) 10       Peripheral muscle strengthening which included 1 set of 15-20 repetitions at a slow, controlled 10-13 second per rep pace focused on strengthening supporting musculature for improved body mechanics and functional mobility.  Pt and therapist focused on proper form during treatment to ensure optimal strengthening of each targeted muscle group.  Machines were utilized including torso rotation, leg extension, leg curl, chest press, upright row, tricep extension, bicep curl, leg press, and hip abduction      Not performed 3/31/22 :  Slouch correct, x15  SLR 3x10/ea " "  Hamstring stretch, 3x30" ea   Piriformis stretch, 2x30" ea  PPT, x20 (VCs for correct performance)  Thoracic extension over chair 10 x10"  Seated rollout  10 x 10"      Education provided:     Written Home Exercises Provided: Patient instructed to cont prior HEP.  Stretching: extensions in standing, ext in lie, hamstring stretch, knees to chest  Strengthening:  Cardio: started 2/17/22 with handouts given  Lifting: started 3/31/22 and handout given  Lumbar roll: he has to find and use    Exercises were reviewed and Royer was able to demonstrate them prior to the end of the session.  Royer demonstrated good  understanding of the education provided.     See EMR under Patient Instructions for exercises provided 3/31/22.       Assessment   Royer attended today.  We revisited extension principles as he has not been doing consistent extension exercises.  Discussed strategies for mechanical pain and extensions after sitting/bending and flexion after standing activities.  Reprinted HEP and provided lifting handouts in conjunction with continued lifting training with dead lifts and single leg dead lifts (golder's lift)   Patient presents with improving spinal mobility.  Discussed long term management strategies of symptoms with good body mech, and consistent exercise.   MedX was performed at 63ft lbs with 20 reps performed, recommend increase next visit.        Patient is making fair progress towards established goals.  Pt will continue to benefit from skilled outpatient physical therapy to address the deficits stated in the impairment chart, provide pt/family education and to maximize pt's level of independence in the home and community environment. Further, therapy goal is to continue trunk mobility and stability progressions in functional postures (ie.standing and seated) pending tolerance. Pt would likely benefit from incorporation of thoracic mobility to address ongoing stiffness, deficits in overall mobility, and " to reduce further instability of lumbar spine.       Anticipated Barriers for therapy: none  Pt's spiritual, cultural and educational needs considered and pt agreeable to plan of care and goals as stated below:     Goals:   Short Term Goals:  4 weeks  1. Report decreased in pain at worse less than  </= 8/10  to increase tolerance for functional activities. GOAL MET 03/15/22  3. Increased core and bilateral LE MMT 1/2  to increase tolerance for ADL and work activities. GOAL MET 03/15/22  5. Pt to tolerate HEP to improve ROM and independence with ADL's. GOAL MET 03/15/22  6. Pt to improve range of motion by 25% to allow for improved functional mobility to allow for improvement in IADLs. (appropriate & ongoing)    Long Term Goals: 8 weeks  1. Report decreased in pain at worse less than  </= 1/10  to increase tolerance for functional mobility.  On going  3. Increased core and bilateral LE MMT 1 grade to increase tolerance for ADL and work activities.On going  4. Pt will report at 34% or less limitation on FOTO Lumbar spine survey  to demonstrate decrease in disability and improvement in back pain.On going  5. Pt to be Independent with HEP to improve ROM and independence with ADL's. On going  7. Pt to demonstrate negative Bridge Test in order to show improved core strength for lumbar stabilization. On going  8. Pt to improve range of motion by 75% to allow for improved functional mobility to allow for improvement in IADLs. On going    Plan   Continue with established Plan of Care towards established PT goals. Reinforce HEP,  lifting principles, and ensure patient has cardio program he is following in upcoming visits, continue to work on thoracic mobility and strength for functional tasks,  to assist with long term strategies to manage symptoms and promote well being    Tigist Gonsalves, PT  03/31/2022

## 2022-04-05 ENCOUNTER — HOSPITAL ENCOUNTER (OUTPATIENT)
Dept: RADIOLOGY | Facility: HOSPITAL | Age: 57
Discharge: HOME | End: 2022-04-05
Attending: NURSE PRACTITIONER
Payer: MEDICAID

## 2022-04-05 DIAGNOSIS — M25.572 PAIN IN LEFT ANKLE AND JOINTS OF LEFT FOOT: ICD-10-CM

## 2022-04-05 PROCEDURE — 73610 X-RAY EXAM OF ANKLE: CPT | Mod: TC,FY,LT

## 2022-04-05 PROCEDURE — 73610 X-RAY EXAM OF ANKLE: CPT | Mod: 26,LT,, | Performed by: RADIOLOGY

## 2022-04-05 PROCEDURE — 73610 XR ANKLE COMPLETE 3 VIEW LEFT: ICD-10-PCS | Mod: 26,LT,, | Performed by: RADIOLOGY

## 2022-04-06 NOTE — PROGRESS NOTES
"[facility]      Name: Royer Driscoll  Clinic Number: [id number]    Therapy Diagnosis:   Encounter Diagnoses   Name Primary?    Decreased functional activity tolerance Yes    Weakness of both lower extremities      Physician: Zulma Aj NP    Visit Date: 2022    Physician Orders: PT Eval and Treat   Medical Diagnosis from Referral: Low back pain [M54.50]  Evaluation Date: 2022  Authorization Period Expiration: 2022  Plan of Care Expiration: 2022 to 22  Reassessment Due: 04/15/22  Visit # / Visits authorized:      Precautions: Standard    Time In: 1000  Time Out: 1100  Total Billable Time:  60 minutes    Pattern of pain determined: Pattern 1 PEP    Subjective   Royer reports his back has been improving a little, but continues to experience a nagging pain in R mid-to-low back. Pain does not extend into buttocks or RLE. The pain can become sharp sometimes, mostly when it becomes irritated at work. No issues following last session. Intends to participate in wellness following completion of HB.     Patient reports tolerating previous visit well.  Patient reports their pain to be 2/10 on a 0-10 scale with 0 being no pain and 10 being the worst pain imaginable.  Pain Location: R low back     Occupation: Audio/video installation   Pts goals: "less pain and be able to sit for longer durations"     Objective     Baseline IM Testing Results:   Date of testin2022  ROM 0-36 deg 0-42 on 22   Max Peak Torque 104    Min Peak Torque 42    Flex/Ext Ratio 2.4   % below normative data 66%     Midpoint IM Testing Results:   Date of testin/15/22  ROM 0-42 deg   Max Peak Torque 142   Min Peak Torque 34   % below normative data 66%       Outcomes (FOTO):  Initial score: 37%  Visit 5 score: 43% limited  Visit 10 score: 41%  Goal: </= 34%      Treatment    Pt was instructed in and performed the following:     Royer received therapeutic exercises " "to develop/improved posture, cardiovascular endurance, muscular endurance, lumbar/cervical ROM, strength and muscular endurance for 58 minutes including the following exercises:     Treadmill (2.7mph), x10 min     Standing ext, 5 sec holds x15  +Thoracic ext over chair back, x10   +QD<>child's pose, 5 sec holds x10  +Child's pose c/ lateral stretching, 2x30" ea side   Standing* open books, x10 ea  Lat pull-downs on FM (20#), 2x10  Deadlift c/ 25# KB (VCs for form), 2x10      [facility] 4/7/2022   Visit Number 16   VAS Pain Rating 2   Treadmill Time (in min.) 10   Speed 2.7   Lumbar Stretches - Slouch Overcorrection -   Extension in Lying 10   Extension in Standing 15   Flexion in Lying -   Flexion in Sitting -   Lumbar Extension Seat Pad -   Femur Restraint -   Top Dead Center -   Counterweight -   Lumbar Flexion 45   Lumbar Extension 0   Lumbar Peak Torque -   Min Torque -   Test Percent Below Normative Data -   Test Percent Gain in Strength from Initial  -   Lumbar Weight 63   Repetitions 20   Rating of Perceived Exertion 4   Ice - Z Lie (in min.) 10         Peripheral muscle strengthening which included 1 set of 15-20 repetitions at a slow, controlled 10-13 second per rep pace focused on strengthening supporting musculature for improved body mechanics and functional mobility.  Pt and therapist focused on proper form during treatment to ensure optimal strengthening of each targeted muscle group.  Machines were utilized including torso rotation, leg extension, leg curl, chest press, upright row, tricep extension, bicep curl, leg press, and hip abduction      Not performed 4/7/2022 2/2 lack of time:  DKTC 5x15'  LTR, x10  Prone pressups, x10  Standing QL stretch, 2x45" ea  Cat-cow, x10  FM palloff press (13#), x20 ea  FM march c/ isometric hold (13#), 2x20  Modified SL deadlift c/ 10#, x10 ea       Education provided:   -Rationale for POC and progressions      Written Home Exercises Provided: Patient " instructed to cont prior HEP.  Stretching: extensions in standing, ext in lie, hamstring stretch, knees to chest  Strengthening:  Cardio: started 2/17/22 with handouts given  Lifting: started 3/31/22 and handout given  Lumbar roll: he has to find and use    Exercises were reviewed and Royer was able to demonstrate them prior to the end of the session.  Royer demonstrated good  understanding of the education provided.     See EMR under Patient Instructions for exercises provided 3/31/22.       Assessment   Royer tolerated today's session well. Additional stretching to address R paraspinal pain incorporated c/ good response by pt. MedX resistance maintained to that of previous session 2/2 reported difficulty. Pt performed 20 reps of 63 ft lbs c/ a reported exertion of 4/10. Three deg increase in ROM was also tolerated. PT once again reinforced importance of HEP for optimal therapeutic gain. Pt does intend to transition to wellness following completion of HB.     Patient is making fair progress towards established goals.  Pt will continue to benefit from skilled outpatient physical therapy to address the deficits stated in the impairment chart, provide pt/family education and to maximize pt's level of independence in the home and community environment.       Anticipated Barriers for therapy: none  Pt's spiritual, cultural and educational needs considered and pt agreeable to plan of care and goals as stated below:     Goals:   Short Term Goals:  4 weeks  1. Report decreased in pain at worse less than  </= 8/10  to increase tolerance for functional activities. GOAL MET 03/15/22  3. Increased core and bilateral LE MMT 1/2  to increase tolerance for ADL and work activities. GOAL MET 03/15/22  5. Pt to tolerate HEP to improve ROM and independence with ADL's. GOAL MET 03/15/22  6. Pt to improve range of motion by 25% to allow for improved functional mobility to allow for improvement in IADLs. (appropriate &  ongoing)    Long Term Goals: 8 weeks  1. Report decreased in pain at worse less than  </= 1/10  to increase tolerance for functional mobility.  On going  3. Increased core and bilateral LE MMT 1 grade to increase tolerance for ADL and work activities.On going  4. Pt will report at 34% or less limitation on FOTO Lumbar spine survey  to demonstrate decrease in disability and improvement in back pain.On going  5. Pt to be Independent with HEP to improve ROM and independence with ADL's. On going  7. Pt to demonstrate negative Bridge Test in order to show improved core strength for lumbar stabilization. On going  8. Pt to improve range of motion by 75% to allow for improved functional mobility to allow for improvement in IADLs. On going    Plan   Continue with established Plan of Care towards established PT goals. Reinforce HEP,  lifting principles, and ensure patient has cardio program he is following in upcoming visits, continue to work on thoracic mobility and strength for functional tasks,  to assist with long term strategies to manage symptoms and promote well being.    Deidre Moncada, PT, DPT  04/07/22

## 2022-04-07 ENCOUNTER — CLINICAL SUPPORT (OUTPATIENT)
Dept: REHABILITATION | Facility: HOSPITAL | Age: 57
End: 2022-04-07
Payer: MEDICAID

## 2022-04-07 DIAGNOSIS — R29.898 WEAKNESS OF BOTH LOWER EXTREMITIES: ICD-10-CM

## 2022-04-07 DIAGNOSIS — R68.89 DECREASED FUNCTIONAL ACTIVITY TOLERANCE: Primary | ICD-10-CM

## 2022-04-07 PROCEDURE — 97110 THERAPEUTIC EXERCISES: CPT | Mod: PN

## 2022-04-12 NOTE — PROGRESS NOTES
"[facility]      Name: Royer Driscoll  Clinic Number: [id number]    Therapy Diagnosis:   Encounter Diagnoses   Name Primary?    Decreased functional activity tolerance Yes    Weakness of both lower extremities      Physician: Zulma Aj NP    Visit Date: 2022    Physician Orders: PT Eval and Treat   Medical Diagnosis from Referral: Low back pain [M54.50]  Evaluation Date: 2022  Authorization Period Expiration: 2022  Plan of Care Expiration: 2022 to 22  Reassessment Due: 04/15/22  Visit # / Visits authorized:      Precautions: Standard    Time In: 1256  Time Out: 1350  Total Billable Time: 54 minutes    Pattern of pain determined: Pattern 1 PEP    Subjective   Royer reports back is doing okay today (3/10). Was feeling good after last session.     Patient reports tolerating previous visit well.  Patient reports their pain to be 3/10 on a 0-10 scale with 0 being no pain and 10 being the worst pain imaginable.  Pain Location: R low back     Occupation: Audio/video installation   Pts goals: "less pain and be able to sit for longer durations"     Objective     Baseline IM Testing Results:   Date of testin2022  ROM 0-36 deg 0-42 on 22   Max Peak Torque 104    Min Peak Torque 42    Flex/Ext Ratio 2.4   % below normative data 66%     Midpoint IM Testing Results:   Date of testin/15/22  ROM 0-42 deg   Max Peak Torque 142   Min Peak Torque 34   % below normative data 66%       Outcomes (FOTO):  Initial score: 37%  Visit 5 score: 43% limited  Visit 10 score: 41%  Goal: </= 34%      Treatment    Pt was instructed in and performed the following:     Royer received therapeutic exercises to develop/improved posture, cardiovascular endurance, muscular endurance, lumbar/thoracic ROM, strength and muscular endurance for 53 minutes including the following exercises:     Treadmill (2.7mph), x10 min     Standing ext, 5 sec holds x15  Thoracic " "ext over chair back, x10   QD<>child's pose, 5 sec holds x10  Child's pose c/ lateral stretching, 2x30" ea side   Standing* open books, x10 ea  Deadlift c/ 25# KB (VCs for form), 2x10  FM palloff press (20#), x18 ea       [facility] 4/13/2022   Visit Number 17   VAS Pain Rating 3   Treadmill Time (in min.) 10   Speed 2.7   Lumbar Stretches - Slouch Overcorrection -   Extension in Lying -   Extension in Standing 15   Flexion in Lying -   Flexion in Sitting -   Lumbar Extension Seat Pad -   Femur Restraint -   Top Dead Center -   Counterweight -   Lumbar Flexion -   Lumbar Extension -   Lumbar Peak Torque -   Min Torque -   Test Percent Below Normative Data -   Test Percent Gain in Strength from Initial  -   Lumbar Weight 67   Repetitions 15   Rating of Perceived Exertion 3   Ice - Z Lie (in min.) 10       Peripheral muscle strengthening which included 1 set of 15-20 repetitions at a slow, controlled 10-13 second per rep pace focused on strengthening supporting musculature for improved body mechanics and functional mobility.  Pt and therapist focused on proper form during treatment to ensure optimal strengthening of each targeted muscle group.  Machines were utilized including torso rotation, leg extension, leg curl, chest press, upright row, tricep extension, bicep curl, leg press, and hip abduction.      Not performed 4/13/2022 2/2 lack of time:  DKTC 5x15'  LTR, x10  Prone pressups, x10  Standing QL stretch, 2x45" ea  Cat-cow, x10  FM march c/ isometric hold (13#), 2x20  Modified SL deadlift c/ 10#, x10 ea   Lat pull-downs on FM (20#), 2x10      Education provided:   -Rationale for POC and progressions      Written Home Exercises Provided: Patient instructed to cont prior HEP.  Stretching: extensions in standing, ext in lie, hamstring stretch, knees to chest  Strengthening:  Cardio: started 2/17/22 with handouts given  Lifting: started 3/31/22 and handout given  Lumbar roll: he has to find and " use    Exercises were reviewed and Royer was able to demonstrate them prior to the end of the session.  Royer demonstrated good  understanding of the education provided.     See EMR under Patient Instructions for exercises provided 3/31/22.       Assessment   Royer tolerated today's tx well. Focus of sessions continues to be on release of R trunk paraspinals/QL and lumbopelvic stability in upright postures to address functional deficits. Improved standing tolerance noted overall. MedX resistance increased to 67 ft lbs c/ pt performing 15 reps c/ a reported exertion of 3/10. All other progressions tolerated without issue.     Patient is making fair progress towards established goals.  Pt will continue to benefit from skilled outpatient physical therapy to address the deficits stated in the impairment chart, provide pt/family education and to maximize pt's level of independence in the home and community environment.       Anticipated Barriers for therapy: none  Pt's spiritual, cultural and educational needs considered and pt agreeable to plan of care and goals as stated below:     Goals:   Short Term Goals:  4 weeks  1. Report decreased in pain at worse less than  </= 8/10  to increase tolerance for functional activities. GOAL MET 03/15/22  3. Increased core and bilateral LE MMT 1/2  to increase tolerance for ADL and work activities. GOAL MET 03/15/22  5. Pt to tolerate HEP to improve ROM and independence with ADL's. GOAL MET 03/15/22  6. Pt to improve range of motion by 25% to allow for improved functional mobility to allow for improvement in IADLs. (appropriate & ongoing)    Long Term Goals: 8 weeks  1. Report decreased in pain at worse less than  </= 1/10  to increase tolerance for functional mobility.  On going  3. Increased core and bilateral LE MMT 1 grade to increase tolerance for ADL and work activities.On going  4. Pt will report at 34% or less limitation on FOTO Lumbar spine survey  to demonstrate  decrease in disability and improvement in back pain.On going  5. Pt to be Independent with HEP to improve ROM and independence with ADL's. On going  7. Pt to demonstrate negative Bridge Test in order to show improved core strength for lumbar stabilization. On going  8. Pt to improve range of motion by 75% to allow for improved functional mobility to allow for improvement in IADLs. On going    Plan   Continue with established Plan of Care towards established PT goals. Reinforce HEP,  lifting principles, and ensure patient has cardio program he is following in upcoming visits, continue to work on thoracic mobility and strength for functional tasks,  to assist with long term strategies to manage symptoms and promote well being.    Deidre Moncada, PT, DPT  04/13/22

## 2022-04-13 ENCOUNTER — CLINICAL SUPPORT (OUTPATIENT)
Dept: REHABILITATION | Facility: HOSPITAL | Age: 57
End: 2022-04-13
Payer: MEDICAID

## 2022-04-13 DIAGNOSIS — R68.89 DECREASED FUNCTIONAL ACTIVITY TOLERANCE: Primary | ICD-10-CM

## 2022-04-13 DIAGNOSIS — R29.898 WEAKNESS OF BOTH LOWER EXTREMITIES: ICD-10-CM

## 2022-04-13 PROCEDURE — 97110 THERAPEUTIC EXERCISES: CPT | Mod: PN

## 2022-04-18 NOTE — PROGRESS NOTES
"[facility]      Name: Royer Driscoll  Clinic Number: [id number]    Therapy Diagnosis:   Encounter Diagnoses   Name Primary?    Decreased functional activity tolerance Yes    Weakness of both lower extremities      Physician: Zulma Aj NP    Visit Date: 2022    Physician Orders: PT Eval and Treat   Medical Diagnosis from Referral: Low back pain [M54.50]  Evaluation Date: 2022  Authorization Period Expiration: 2022  Plan of Care Expiration: 2022 to 22  Reassessment Due: 22  Visit # / Visits authorized:      Precautions: Standard    Time In: 1000  Time Out: 1100  Total Billable Time: 60 minutes    Pattern of pain determined: Pattern 1 PEP    Subjective   Royer reports back is doing so-so. No issues following last session, but reports he generally does feel some "tension" following performance of MedX machine. Did not stretch yet this morning because he knew he was coming to therapy. Reports that he is going to test out his sitting tolerance this Thursday. Historically he has only been able to tolerate about 40 minutes of sitting before pain became significant, however is going on a 5-6 hour ride Thursday.     Patient reports tolerating previous visit well.  Patient reports their pain to be 3/10 on a 0-10 scale with 0 being no pain and 10 being the worst pain imaginable.  Pain Location: R low back     Occupation: Audio/video installation   Pts goals: "less pain and be able to sit for longer durations"     Objective     Baseline IM Testing Results:   Date of testin2022  ROM 0-36 deg 0-42 on 22   Max Peak Torque 104    Min Peak Torque 42    Flex/Ext Ratio 2.4   % below normative data 66%     Midpoint IM Testing Results:   Date of testin/15/22  ROM 0-42 deg   Max Peak Torque 142   Min Peak Torque 34   % below normative data 66%       Outcomes (FOTO):  Initial score: 37%  Visit 5 score: 43% limited  Visit 10 score: " "41%  Goal: </= 34%      Treatment    Pt was instructed in and performed the following:     Royer received therapeutic exercises to develop/improved posture, cardiovascular endurance, muscular endurance, lumbar/thoracic ROM, strength and muscular endurance for 57 minutes including the following exercises:     Treadmill (2.7mph), x10 min     Standing ext, 5 sec holds x15  +Seated rollouts c/ ball (center only), x15  Thoracic ext over chair back, x10   Child's pose c/ lateral stretching, 2x20" ea side   Standing* open books, x10 ea  FM palloff press (20#), x18 ea  Lat pull-downs (20# total), 2x10       [facility] 4/19/2022   Visit Number 18   VAS Pain Rating 3   Treadmill Time (in min.) 10   Speed 2.7   Lumbar Stretches - Slouch Overcorrection -   Extension in Lying -   Extension in Standing 15   Flexion in Lying -   Flexion in Sitting 15   Lumbar Extension Seat Pad -   Femur Restraint -   Top Dead Center -   Counterweight -   Lumbar Flexion -   Lumbar Extension -   Lumbar Peak Torque -   Min Torque -   Test Percent Below Normative Data -   Test Percent Gain in Strength from Initial  -   Lumbar Weight 67   Repetitions 18   Rating of Perceived Exertion 6   Ice - Z Lie (in min.) -       Peripheral muscle strengthening which included 1 set of 15-20 repetitions at a slow, controlled 10-13 second per rep pace focused on strengthening supporting musculature for improved body mechanics and functional mobility.  Pt and therapist focused on proper form during treatment to ensure optimal strengthening of each targeted muscle group.  Machines were utilized including torso rotation, leg extension, leg curl, chest press, upright row, tricep extension, bicep curl, leg press, and hip abduction.      Education provided:   -Rationale for POC and progressions      Written Home Exercises Provided: Patient instructed to cont prior HEP.  Stretching: extensions in standing, ext in lie, hamstring stretch, knees to " chest  Strengthening:  Cardio: started 2/17/22 with handouts given  Lifting: started 3/31/22 and handout given  Lumbar roll: he has to find and use    Exercises were reviewed and Royer was able to demonstrate them prior to the end of the session.  Royer demonstrated good  understanding of the education provided.     See EMR under Patient Instructions for exercises provided 3/31/22.       Assessment   Royer tolerated today's tx well. Brief reassessment performed c/ pt demonstrating improved lumbar ROM (+ 9 deg) and lumbar strength. Pt performed 18 reps of 67 ft lbs on lumbar MedX c/ a reported exertion of 6/10. Continues to experience R low back pain despite improved functional performance and upright tolerance. PT reinforced importance of HEP consistency for optimal therapeutic gain, particularly during his vacation, c/ emphasis on stretching before/after/during long drive. Will continue to progress exercise as functional implicated and appropriate.        Patient is making fair progress towards established goals.  Pt will continue to benefit from skilled outpatient physical therapy to address the deficits stated in the impairment chart, provide pt/family education and to maximize pt's level of independence in the home and community environment.       Anticipated Barriers for therapy: none  Pt's spiritual, cultural and educational needs considered and pt agreeable to plan of care and goals as stated below:     Goals:   Short Term Goals:  4 weeks  1. Report decreased in pain at worse less than  </= 8/10  to increase tolerance for functional activities. GOAL MET 03/15/22  3. Increased core and bilateral LE MMT 1/2  to increase tolerance for ADL and work activities. GOAL MET 03/15/22  5. Pt to tolerate HEP to improve ROM and independence with ADL's. GOAL MET 03/15/22  6. Pt to improve range of motion by 25% to allow for improved functional mobility to allow for improvement in IADLs. (appropriate & ongoing)    Long  Term Goals: 8 weeks  1. Report decreased in pain at worse less than  </= 1/10  to increase tolerance for functional mobility.  On going  3. Increased core and bilateral LE MMT 1 grade to increase tolerance for ADL and work activities.On going  4. Pt will report at 34% or less limitation on FOTO Lumbar spine survey  to demonstrate decrease in disability and improvement in back pain.On going  5. Pt to be Independent with HEP to improve ROM and independence with ADL's. On going  7. Pt to demonstrate negative Bridge Test in order to show improved core strength for lumbar stabilization. On going  8. Pt to improve range of motion by 75% to allow for improved functional mobility to allow for improvement in IADLs. On going    Plan   Continue with established Plan of Care towards established PT goals. Reinforce HEP,  lifting principles, and ensure patient has cardio program he is following in upcoming visits, continue to work on thoracic mobility and strength for functional tasks,  to assist with long term strategies to manage symptoms and promote well being.    Deidre Moncada, PT, DPT  04/19/22

## 2022-04-19 ENCOUNTER — CLINICAL SUPPORT (OUTPATIENT)
Dept: REHABILITATION | Facility: HOSPITAL | Age: 57
End: 2022-04-19
Payer: MEDICAID

## 2022-04-19 DIAGNOSIS — R68.89 DECREASED FUNCTIONAL ACTIVITY TOLERANCE: Primary | ICD-10-CM

## 2022-04-19 DIAGNOSIS — R29.898 WEAKNESS OF BOTH LOWER EXTREMITIES: ICD-10-CM

## 2022-04-19 PROCEDURE — 97110 THERAPEUTIC EXERCISES: CPT | Mod: PN

## 2022-04-27 ENCOUNTER — CLINICAL SUPPORT (OUTPATIENT)
Dept: REHABILITATION | Facility: HOSPITAL | Age: 57
End: 2022-04-27
Payer: MEDICAID

## 2022-04-27 DIAGNOSIS — R68.89 DECREASED FUNCTIONAL ACTIVITY TOLERANCE: Primary | ICD-10-CM

## 2022-04-27 DIAGNOSIS — R29.898 WEAKNESS OF BOTH LOWER EXTREMITIES: ICD-10-CM

## 2022-04-27 PROCEDURE — 97110 THERAPEUTIC EXERCISES: CPT | Mod: PN

## 2022-04-27 NOTE — PATIENT INSTRUCTIONS
Home stretching (perform daily):      *Perform to each side as well        Home strengthening (perform 3x/week):                Alternative core exercises (per pt request):

## 2022-04-27 NOTE — PROGRESS NOTES
"  [facility]      Name: Royer Driscoll  Clinic Number: [id number]    Therapy Diagnosis:   Encounter Diagnoses   Name Primary?    Decreased functional activity tolerance Yes    Weakness of both lower extremities      Physician: Zulma Aj NP    Visit Date: 2022    Physician Orders: PT Eval and Treat   Medical Diagnosis from Referral: Low back pain [M54.50]  Evaluation Date: 2022  Authorization Period Expiration: 2022  Plan of Care Expiration: 2022 to 22  Reassessment Due: 22  Visit # / Visits authorized:      Precautions: Standard    Time In: 1345  Time Out: 1450  Total Billable Time: 65 minutes    Pattern of pain determined: Pattern 1 PEP    Subjective   Royer reports back is doing fine. Went on a mini vacation over the weekend (which required 5 hr car ride) and reports he did not experience much back pain. Plans to participate in wellness program.     Patient reports tolerating previous visit well.  Patient reports their pain to be 3/10 on a 0-10 scale with 0 being no pain and 10 being the worst pain imaginable.  Pain Location: R low back     Occupation: Audio/video installation   Pts goals: "less pain and be able to sit for longer durations"     Objective     Baseline IM Testing Results:   Date of testin2022  ROM 0-36 deg 0-42 on 22   Max Peak Torque 104    Min Peak Torque 42    Flex/Ext Ratio 2.4   % below normative data 66%     Midpoint IM Testing Results:   Date of testin/15/22  ROM 0-42 deg   Max Peak Torque 142   Min Peak Torque 34   % below normative data 66%       Outcomes (FOTO):  Initial score: 37%  Visit 5 score: 43% limited  Visit 10 score: 41%  Goal: </= 34%      Treatment    Pt was instructed in and performed the following:     Royer received therapeutic exercises to develop/improved posture, cardiovascular endurance, muscular endurance, lumbar/thoracic ROM, strength and muscular endurance for 62 " "minutes including the following exercises:     Treadmill (2.7mph), x10 min     Home stretching (daily):  Standing ext, 5 sec holds x15  Child's pose c/ lateral stretching, 2x20" ea side   Thoracic extension over chair back, 5 sec x 10      Home strengthening (3x/week):  FM palloff press c/ BTB, x15 ea   Core rotation c/ BTB, x12 ea  Prone lumbar ext over ball, 2x10  Lat pull-downs c/ BTB, 2x15      [facility] 4/27/2022   Visit Number 19   VAS Pain Rating 3   Treadmill Time (in min.) 10   Speed 2.7   Lumbar Stretches - Slouch Overcorrection -   Extension in Lying -   Extension in Standing 15   Flexion in Lying -   Flexion in Sitting -   Lumbar Extension Seat Pad -   Femur Restraint -   Top Dead Center -   Counterweight -   Lumbar Flexion -   Lumbar Extension -   Lumbar Peak Torque -   Min Torque -   Test Percent Below Normative Data -   Test Percent Gain in Strength from Initial  -   Lumbar Weight 67   Repetitions 18   Rating of Perceived Exertion 5   Ice - Z Lie (in min.) -       Peripheral muscle strengthening which included 1 set of 15-20 repetitions at a slow, controlled 10-13 second per rep pace focused on strengthening supporting musculature for improved body mechanics and functional mobility.  Pt and therapist focused on proper form during treatment to ensure optimal strengthening of each targeted muscle group.  Machines were utilized including torso rotation, leg extension, leg curl, chest press, upright row, tricep extension, bicep curl, leg press, and hip abduction.      Education provided:   -Rationale for POC and progressions      Written Home Exercises Provided: Patient instructed to cont prior HEP.  Stretching: extensions in standing, ext in lie, hamstring stretch, knees to chest  Strengthening:  Cardio: started 2/17/22 with handouts given   Lifting: started 3/31/22 and handout given  Lumbar roll: he has to find and use     Exercises were reviewed and Royer was able to demonstrate them prior " to the end of the session.  Royer demonstrated good  understanding of the education provided.     See EMR under Patient Instructions for exercises provided 3/31/22.       Assessment   Royer tolerated today's tx well. Home exercise program reviewed for use upon D/C. Printout of exercises and blue/black/white tbands given for at-home use. Pt demonstrated good understanding to info given. MedX resistance maintained to 67 ft lbs c/ pt performing 18 reps c/ a reported exertion of 5/10. Pt does intend to transition to wellness following completion of HB. Final MedX assessment to be performed at next session.          Patient is making fair progress towards established goals.  Pt will continue to benefit from skilled outpatient physical therapy to address the deficits stated in the impairment chart, provide pt/family education and to maximize pt's level of independence in the home and community environment.       Anticipated Barriers for therapy: none  Pt's spiritual, cultural and educational needs considered and pt agreeable to plan of care and goals as stated below:     Goals:   Short Term Goals:  4 weeks  1. Report decreased in pain at worse less than  </= 8/10  to increase tolerance for functional activities. GOAL MET 03/15/22  3. Increased core and bilateral LE MMT 1/2  to increase tolerance for ADL and work activities. GOAL MET 03/15/22  5. Pt to tolerate HEP to improve ROM and independence with ADL's. GOAL MET 03/15/22  6. Pt to improve range of motion by 25% to allow for improved functional mobility to allow for improvement in IADLs. (appropriate & ongoing)    Long Term Goals: 8 weeks  1. Report decreased in pain at worse less than  </= 1/10  to increase tolerance for functional mobility.  On going  3. Increased core and bilateral LE MMT 1 grade to increase tolerance for ADL and work activities.On going  4. Pt will report at 34% or less limitation on FOTO Lumbar spine survey  to demonstrate decrease in  disability and improvement in back pain.On going  5. Pt to be Independent with HEP to improve ROM and independence with ADL's. On going  7. Pt to demonstrate negative Bridge Test in order to show improved core strength for lumbar stabilization. On going  8. Pt to improve range of motion by 75% to allow for improved functional mobility to allow for improvement in IADLs. On going    Plan   Continue with established Plan of Care towards established PT goals. Reinforce HEP,  lifting principles, and ensure patient has cardio program he is following in upcoming visits, continue to work on thoracic mobility and strength for functional tasks,  to assist with long term strategies to manage symptoms and promote well being.     Deidre Moncada, PT, DPT  04/27/22

## 2022-05-04 ENCOUNTER — CLINICAL SUPPORT (OUTPATIENT)
Dept: REHABILITATION | Facility: HOSPITAL | Age: 57
End: 2022-05-04
Payer: MEDICAID

## 2022-05-04 DIAGNOSIS — R29.898 WEAKNESS OF BOTH LOWER EXTREMITIES: ICD-10-CM

## 2022-05-04 DIAGNOSIS — R68.89 DECREASED FUNCTIONAL ACTIVITY TOLERANCE: Primary | ICD-10-CM

## 2022-05-04 PROCEDURE — 97110 THERAPEUTIC EXERCISES: CPT | Mod: PN

## 2022-05-04 NOTE — PROGRESS NOTES
"  [facility]     Name: Royer Driscoll  Clinic Number: [id number]    Therapy Diagnosis:   Encounter Diagnoses   Name Primary?    Decreased functional activity tolerance Yes    Weakness of both lower extremities      Physician: Zulma Aj NP    Visit Date: 2022    Physician Orders: PT Eval and Treat   Medical Diagnosis from Referral: Low back pain [M54.50]  Evaluation Date: 2022  Authorization Period Expiration: 2022  Plan of Care Expiration: 2022 to 22  Reassessment Due: 22  Visit # / Visits authorized:      Precautions: Standard    Time In: 1217  Time Out: 1300  Total Billable Time: 43 minutes    Pattern of pain determined: Pattern 1 PEP    Subjective   Royer reports back is doing okay. Still flares up intermittent, when he does too much. Had no issues c/ given HEP. Is already signed up for wellness program.     Patient reports tolerating previous visit well.  Patient reports their pain to be 3/10 on a 0-10 scale with 0 being no pain and 10 being the worst pain imaginable.  Pain Location: R low back     Occupation: Audio/video installation   Pts goals: "less pain and be able to sit for longer durations"     Objective   Trunk ROM as of 22: WNL in all planes (mild tightness noted c/ R rotation)    Baseline IM Testing Results:   Date of testin2022  ROM 0-36 deg 0-42 on 22   Max Peak Torque 104    Min Peak Torque 42    Flex/Ext Ratio 2.4   % below normative data 66%     Midpoint IM Testing Results:   Date of testin/15/22  ROM 0-42 deg   Max Peak Torque 142   Min Peak Torque 34   % below normative data 66%     Endpoint IM Testing Results:   Date of testin22  ROM 0-45 deg   Max Peak Torque 143   Min Peak Torque 49   % below normative data 60%     Outcomes (FOTO):  Initial score: 37%  Visit 5 score: 43% limited  Visit 10 score: 41%  Visit 20 score: 37%  Goal: </= 34%      Treatment    Pt was instructed in " and performed the following:     Royer received therapeutic exercises to develop/improved posture, cardiovascular endurance, muscular endurance, lumbar/thoracic ROM, strength and muscular endurance for 40 minutes including the following exercises:     Treadmill (2.7mph), x10 min     [facility] 5/4/2022   Visit Number 20   VAS Pain Rating 3   Treadmill Time (in min.) 10   Speed 2.7   Lumbar Stretches - Slouch Overcorrection -   Extension in Lying -   Extension in Standing -   Flexion in Lying -   Flexion in Sitting -   Lumbar Extension Seat Pad 0   Femur Restraint 6   Top Dead Center 24   Counterweight 272   Lumbar Flexion 45   Lumbar Extension 0   Lumbar Peak Torque 143   Min Torque 49   Test Percent Below Normative Data 60   Test Percent Gain in Strength from Initial  16   Lumbar Weight -   Repetitions -   Rating of Perceived Exertion -   Ice - Z Lie (in min.) -       Peripheral muscle strengthening which included 1 set of 15-20 repetitions at a slow, controlled 10-13 second per rep pace focused on strengthening supporting musculature for improved body mechanics and functional mobility.  Pt and therapist focused on proper form during treatment to ensure optimal strengthening of each targeted muscle group.  Machines were utilized including torso rotation, leg extension, leg curl, chest press, upright row, tricep extension, bicep curl, leg press, and hip abduction.      Education provided:   -MedX isometric testing: technique and results       Written Home Exercises Provided: Patient instructed to cont prior HEP.  Stretching/strengthening: see previous progress note for updated HEP  Cardio: started 2/17/22 with handouts given   Lifting: started 3/31/22 and handout given  Lumbar roll: he has to find and use     Exercises were reviewed and Royer was able to demonstrate them prior to the end of the session.  Royer demonstrated good  understanding of the education provided.     See EMR under Patient  Instructions for exercises provided 3/31/22.       Assessment   Royer has attended 20 visits of [facility] program for aerobic work, MedX isometric testing with dynamic strengthening on MedX equipment for spine, whole body strengthening on MedX equipment, establishment of HEP, and education on exercise, posture, biomechanics and ergonomics. Pt has completed the [facility] program and is ready to transition to wellness program. Importance of the wellness program and upholding attendance once per week to maintain strength was stressed. Importance of continuing therex and attentiveness to posture and ergonomics also stressed. Pt demonstrates improvement in ability to reduce symptoms, improved posture, improved ROM and improved strength. Home exercise program was reviewed and encouraged for maintenance of aforementioned improvements. Discharge handout with HEP given; the handout included discussed aspects of exercise program, cardio, strengthening, and stretching. Pt expressed and demonstrated understanding information given.       -Improved attention to posture  -Fair compliance to HEP  -Improved 9 degrees of ROM,  initially 0-36 degrees upon MedX testing and currently 0-45 degrees   -Improved strength at each test point on MedX isometric testing with 16% average improvement c/ reduced pain noted by patient    Patient has made good progress towards set goals.       Anticipated Barriers for therapy: none  Pt's spiritual, cultural and educational needs considered and pt agreeable to plan of care and goals as stated below:     Goals:   Short Term Goals:  4 weeks  1. Report decreased in pain at worse less than  </= 8/10  to increase tolerance for functional activities. GOAL MET 03/15/22  3. Increased core and bilateral LE MMT 1/2  to increase tolerance for ADL and work activities. GOAL MET 03/15/22  5. Pt to tolerate HEP to improve ROM and independence with ADL's. GOAL MET 03/15/22  6. Pt to improve range of  motion by 25% to allow for improved functional mobility to allow for improvement in IADLs. GOAL MET 05/04/22    Long Term Goals: 8 weeks  1. Report decreased in pain at worse less than  </= 1/10  to increase tolerance for functional mobility.  NOT MET  3. Increased core and bilateral LE MMT 1 grade to increase tolerance for ADL and work activities. GOAL MET 05/04/22  4. Pt will report at 34% or less limitation on FOTO Lumbar spine survey  to demonstrate decrease in disability and improvement in back pain. NOT MET  5. Pt to be Independent with HEP to improve ROM and independence with ADL's. GOAL MET 05/04/22  7. Pt to demonstrate negative Bridge Test in order to show improved core strength for lumbar stabilization. GOAL MET 05/04/22  8. Pt to improve range of motion by 75% to allow for improved functional mobility to allow for improvement in IADLs. GOAL MET 05/04/22    Plan   Pt has completed the HB program and will be transitioning to wellness going forward.     Deidre Moncada, PT, DPT  05/04/22

## 2022-05-24 ENCOUNTER — DOCUMENTATION ONLY (OUTPATIENT)
Dept: REHABILITATION | Facility: HOSPITAL | Age: 57
End: 2022-05-24
Payer: MEDICAID

## 2022-05-24 NOTE — PROGRESS NOTES
Health  Wellness Visit Note    Name: Royer Driscoll  Clinic Number: [id number]  Physician: Zulma Aj NP  Diagnosis: No diagnosis found.  Past Medical History:   Diagnosis Date    Allergy     Arthritis     back and shoulder pain    Chronic thoracic back pain     Vitamin D deficiency 2016     Visit Number: 21  Precautions: R shoulder      1st PT visit: 2/2/2022  Year of care end date: 2/2/2023  6 Month test  Performed: August 2022  12 Month test performed: February 2023  Mind body plan: 1 F  Patient level: NP    Time In: 9:58 AM  Time Out: 11:02 AM  Total Treatment Time: 64 mins    Wellness Vision 2022  Handout on this week's wellness topic Memory was provided along with a discussion on what it means, the benefits, and suggestions for practice.  Reviewed last week's topic of NA-1st wellness visit.    Subjective:   Patient reports 2/10 back pain.  He stretches every evening. Pt does not exercise regularly, nor does he ice his back. He noted that it become aggravated with long periods of sitting or standing. HC explained the differences between heat and ice.  Signed liability waiver.  He is interested in attending wellness after the 2 free sessions.    Objective:   Royer completed therapeutic stretches (EIL, LOR) and the following MedX exercise machines: core lumbar, torso rotation l/r, leg extension, leg curl, upright row, chest press, biceps curl, triceps extension, leg press    See exercise log in patient folder for rate of exertion and repetitions completed.       Fitness Machine Education Key:  E=education on equipment initiated and further follow up and education needed  I=independent with  and exercise.  The patient:   Adjusts machines to his/her settings   Uses equipment levers, pins, weights safely   Maintains safe and correct posture while exercising   Moves through exercise with correct pace and control   Gets on and off equipment safely      Lumbar/Cervical Ext.  Torso  Rotation  Leg Press    Leg Extension  Seated Leg Curl  Chest Press    Seated Row  Hip ADD  Hip ABD    Triceps Extension  Bicep Curl  Other:        Assessment:   Patient tolerated Patient tolerated MedX Core Lumbar Strength and all other peripheral exercises without an increase in symptoms. Patient warmed up on the treadmill for 5 minutes, stretched, and iced low back for 5 minutes after the workout.    Plan:  Continue with established plan of care towards wellness goals.     Health  : Ashley Cardoso  5/24/2022

## 2022-05-31 ENCOUNTER — DOCUMENTATION ONLY (OUTPATIENT)
Dept: REHABILITATION | Facility: HOSPITAL | Age: 57
End: 2022-05-31
Payer: MEDICAID

## 2022-05-31 NOTE — PROGRESS NOTES
Health  Wellness Visit Note    Name: Royer Driscoll  Clinic Number: [id number]  Physician: Zulma Aj NP  Diagnosis: No diagnosis found.  Past Medical History:   Diagnosis Date    Allergy     Arthritis     back and shoulder pain    Chronic thoracic back pain     Vitamin D deficiency 2016     Visit Number: 22  Precautions: R shoulder      1st PT visit: 2/2/2022  Year of care end date: 2/2/2023  6 Month test  Performed: August 2022  12 Month test performed: February 2023  Mind body plan: 2F  Patient level: NP    Time In: 9:58 AM  Time Out: 10:45 AM  Total Treatment Time: 47 mins    Wellness Vision 2022  Handout on this week's wellness topic Mindfulness was provided along with a discussion on what it means, the benefits, and suggestions for practice.  Reviewed last week's topic of Memory.    Subjective:   Patient reports with a 2/10 back pain.  He did some heavy lifting this weekend, reached 5/10, but pain is subsiding.  He did not ice his back, but learning to incorporate it into his regimen. He stretches every evening. Planning to add another strengthening day each week.    He is interested in attending wellness.  Will bring CC next visit.    Objective:   Royer completed therapeutic stretches (EIL, LOR) and the following MedX exercise machines: core lumbar, torso rotation l/r, leg extension, leg curl, upright row, chest press, biceps curl, triceps extension, leg press    See exercise log in patient folder for rate of exertion and repetitions completed.       Fitness Machine Education Key:  E=education on equipment initiated and further follow up and education needed  I=independent with  and exercise.  The patient:   Adjusts machines to his/her settings   Uses equipment levers, pins, weights safely   Maintains safe and correct posture while exercising   Moves through exercise with correct pace and control   Gets on and off equipment safely      Lumbar/Cervical Ext.  Torso Rotation   Leg Press    Leg Extension  Seated Leg Curl  Chest Press    Seated Row  Hip ADD  Hip ABD    Triceps Extension  Bicep Curl  Other:        Assessment:   Patient tolerated Patient tolerated MedX Core Lumbar Strength and all other peripheral exercises without an increase in symptoms. Patient warmed up on the treadmill for 5 minutes, stretched, and iced low back for 5 minutes after the workout.    Plan:  Continue with established plan of care towards wellness goals.     Health  : Ashley Cardoso  5/31/2022

## 2022-06-07 ENCOUNTER — DOCUMENTATION ONLY (OUTPATIENT)
Dept: REHABILITATION | Facility: HOSPITAL | Age: 57
End: 2022-06-07
Payer: MEDICAID

## 2022-06-07 NOTE — PROGRESS NOTES
Health  Wellness Visit Note    Name: Royer Driscoll  Clinic Number: [id number]  Physician: Zulma Aj NP  Diagnosis: No diagnosis found.  Past Medical History:   Diagnosis Date    Allergy     Arthritis     back and shoulder pain    Chronic thoracic back pain     Vitamin D deficiency 2016     Visit Number: 23  Precautions: R shoulder      1st PT visit: 2/2/2022  Year of care end date: 2/2/2023  6 Month test  Performed: August 2022  12 Month test performed: February 2023  Mind body plan: Plan A - 8 months  Patient level: C    Time In: 10:02 AM  Time Out: 10:50 AM  Total Treatment Time: 48 mins    Wellness Vision 2022  Handout on this week's wellness topic Guilt vs Shame was provided along with a discussion on what it means, the benefits, and suggestions for practice.  Reviewed last week's topic of Mindfulness.    Subjective:   Patient reports with moderate 2/10 back pain.  He noticed it after his stretches last night. He will begin icing more regularly now that he understands the benefits. He stretches every evening. Planning to add another strengthening day each week.    Entered CC into [employer].    Objective:   Royer completed therapeutic stretches (EIL, LOR) and the following MedX exercise machines: core lumbar, torso rotation l/r, leg extension, leg curl, upright row, chest press, biceps curl, triceps extension, leg press    See exercise log in patient folder for rate of exertion and repetitions completed.       Fitness Machine Education Key:  E=education on equipment initiated and further follow up and education needed  I=independent with  and exercise.  The patient:   Adjusts machines to his/her settings   Uses equipment levers, pins, weights safely   Maintains safe and correct posture while exercising   Moves through exercise with correct pace and control   Gets on and off equipment safely      Lumbar/Cervical Ext.  Torso Rotation  Leg Press    Leg Extension  Seated Leg  Curl  Chest Press    Seated Row  Hip ADD  Hip ABD    Triceps Extension  Bicep Curl  Other:        Assessment:   Patient tolerated Patient tolerated MedX Core Lumbar Strength and all other peripheral exercises without an increase in symptoms. Patient warmed up on the treadmill for 5 minutes, stretched, and iced low back for 7 minutes after the workout.    Plan:  Continue with established plan of care towards wellness goals.     Health  : Ashley Cardoso  6/7/2022

## 2022-06-14 ENCOUNTER — DOCUMENTATION ONLY (OUTPATIENT)
Dept: REHABILITATION | Facility: HOSPITAL | Age: 57
End: 2022-06-14
Payer: MEDICAID

## 2022-06-14 NOTE — PROGRESS NOTES
Health  Wellness Visit Note    Name: Royer Driscoll  Clinic Number: [id number]  Physician: Zulma Aj NP  Diagnosis: No diagnosis found.  Past Medical History:   Diagnosis Date    Allergy     Arthritis     back and shoulder pain    Chronic thoracic back pain     Vitamin D deficiency 2016     Visit Number: 24  Precautions: R shoulder      1st PT visit: 2/2/2022  Year of care end date: 2/2/2023  6 Month test  Performed: August 2022  12 Month test performed: February 2023  Mind body plan: Plan A - 8 months  Patient level: C    Time In: 11:08 AM  Time Out: 11:56 AM  Total Treatment Time: 48 mins    Wellness Vision 2022  Handout on this week's wellness topic Anxiety was provided along with a discussion on what it means, the benefits, and suggestions for practice.  Reviewed last week's topic of Guilt vs Shame.    Subjective:   Patient reports with mild to moderate (2/10) back pain today. He started waling this week. Planning for 2-3 days per week of walking and will do some strengthening exercises a couple days.  He noticed a couple of stretches irritate his back (knee to chest and side to side). He will begin icing if he experiences a flare up. He stretches every evening. Learning .    Objective:   Royer completed therapeutic stretches (EIL, LOR) and the following MedX exercise machines: core lumbar, torso rotation l/r, leg extension, leg curl, upright row, chest press, biceps curl, triceps extension, leg press    See exercise log in patient folder for rate of exertion and repetitions completed.       Fitness Machine Education Key:  E=education on equipment initiated and further follow up and education needed  I=independent with  and exercise.  The patient:   Adjusts machines to his/her settings   Uses equipment levers, pins, weights safely   Maintains safe and correct posture while exercising   Moves through exercise with correct pace and control   Gets on and off  equipment safely      Lumbar/Cervical Ext.  Torso Rotation E Leg Press    Leg Extension  Seated Leg Curl  Chest Press    Seated Row  Hip ADD  Hip ABD    Triceps Extension  Bicep Curl  Other:        Assessment:   Patient tolerated Patient tolerated MedX Core Lumbar Strength and all other peripheral exercises without an increase in symptoms. Patient warmed up on the elliptical for 5 minutes, stretched, and iced low back for 7 minutes after the workout.    Plan:  Continue with established plan of care towards wellness goals.     Health  : Ashley Cardoso  6/14/2022

## 2022-06-21 ENCOUNTER — DOCUMENTATION ONLY (OUTPATIENT)
Dept: REHABILITATION | Facility: HOSPITAL | Age: 57
End: 2022-06-21
Payer: MEDICAID

## 2022-06-21 NOTE — PROGRESS NOTES
Health  Wellness Visit Note    Name: Royer Driscoll  Clinic Number: [id number]  Physician: Zulma Aj NP  Diagnosis: No diagnosis found.  Past Medical History:   Diagnosis Date    Allergy     Arthritis     back and shoulder pain    Chronic thoracic back pain     Vitamin D deficiency 2016     Visit Number: 25  Precautions: R shoulder      1st PT visit: 2/2/2022  Year of care end date: 2/2/2023  6 Month test  Performed: August 2022  12 Month test performed: February 2023  Mind body plan: Plan A - 8 months  Patient level: C    Time In: 10:08 AM  Time Out: 10:38 AM  Total Treatment Time: 30 mins    [facility] 2022  Handout on this week's wellness topic Fear Avoidance was provided along with a discussion on what it means, the benefits, and suggestions for practice.  Reviewed last week's topic of Guilt vs Shame.    Subjective:   Patient reports without back pain today. He took a break from his exercises to spend time with family from out of town. He plans to walk 2-3 days per week and will do some strengthening exercises a couple days. He is learning .    Objective:   Royer completed therapeutic stretches (EIL, LOR) and the following MedX exercise machines: core lumbar, torso rotation l/r, leg extension, leg curl, upright row, chest press, biceps curl, triceps extension, leg press    See exercise log in patient folder for rate of exertion and repetitions completed.       Fitness Machine Education Key:  E=education on equipment initiated and further follow up and education needed  I=independent with  and exercise.  The patient:   Adjusts machines to his/her settings   Uses equipment levers, pins, weights safely   Maintains safe and correct posture while exercising   Moves through exercise with correct pace and control   Gets on and off equipment safely      Lumbar/Cervical Ext.  Torso Rotation E Leg Press    Leg Extension E Seated Leg Curl  Chest Press    Seated Row   Hip ADD  Hip ABD    Triceps Extension  Bicep Curl  Other:        Assessment:   Patient tolerated Patient tolerated MedX Core Lumbar Strength and all other peripheral exercises without an increase in symptoms. Patient warmed up on the treadmill for 5 minutes, stretched, and skipped icing low back (he had to bring someone somewhere).     Plan:  Continue with established plan of care towards wellness goals.     Health  : Ashley Cardoso  6/21/2022

## 2022-07-05 ENCOUNTER — DOCUMENTATION ONLY (OUTPATIENT)
Dept: REHABILITATION | Facility: HOSPITAL | Age: 57
End: 2022-07-05
Payer: MEDICAID

## 2022-07-05 NOTE — PROGRESS NOTES
Health  Wellness Visit Note    Name: Royer Driscoll  Clinic Number: [id number]  Physician: Zulma Aj NP  Diagnosis: No diagnosis found.  Past Medical History:   Diagnosis Date    Allergy     Arthritis     back and shoulder pain    Chronic thoracic back pain     Vitamin D deficiency 2016     Visit Number: 26  Precautions: R shoulder      1st PT visit: 2/2/2022  Year of care end date: 2/2/2023  6 Month test  Performed: August 2022  12 Month test performed: February 2023  Mind body plan: Plan A - 8 months  Patient level: C    Time In: 10:07 AM  Time Out: 10:47 AM  Total Treatment Time: 40 mins    Wellness [facility] 2022  Handout on this week's wellness topic Workaholic was provided along with a discussion on what it means, the benefits, and suggestions for practice.  Reviewed last week's topic of NA-missed last week.    Subjective:   Patient reports without back pain today. He took a break from his exercises for vacation time with family. No appts available next 2 weeks.  Will give him 2 free on back end.   He plans to walk 2-3 days per week and will do some strengthening exercises a couple days. He is learning .    Objective:   Royer completed therapeutic stretches (EIL, LOR) and the following MedX exercise machines: core lumbar, torso rotation l/r, leg extension, leg curl, upright row, chest press, biceps curl, triceps extension, leg press    See exercise log in patient folder for rate of exertion and repetitions completed.       Fitness Machine Education Key:  E=education on equipment initiated and further follow up and education needed  I=independent with  and exercise.  The patient:   Adjusts machines to his/her settings   Uses equipment levers, pins, weights safely   Maintains safe and correct posture while exercising   Moves through exercise with correct pace and control   Gets on and off equipment safely      Lumbar/Cervical Ext.  Torso Rotation E Leg Press    Leg  Extension E Seated Leg Curl  Chest Press    Seated Row E Hip ADD  Hip ABD    Triceps Extension  Bicep Curl  Other:        Assessment:   Patient tolerated Patient tolerated MedX Core Lumbar Strength and all other peripheral exercises without an increase in symptoms. Patient warmed up on the treadmill for 5 minutes, stretched, and skipped icing low back (he had to bring someone somewhere).     Plan:  Continue with established plan of care towards wellness goals.     Health  : Ashley Cardoso  7/5/2022

## 2022-07-11 ENCOUNTER — DOCUMENTATION ONLY (OUTPATIENT)
Dept: REHABILITATION | Facility: HOSPITAL | Age: 57
End: 2022-07-11
Payer: MEDICAID

## 2022-07-11 NOTE — PROGRESS NOTES
Health  Wellness Visit Note    Name: Royer Driscoll  Clinic Number: [id number]  Physician: Zulma Aj NP  Diagnosis: No diagnosis found.  Past Medical History:   Diagnosis Date    Allergy     Arthritis     back and shoulder pain    Chronic thoracic back pain     Vitamin D deficiency 2016     Visit Number: 27  Precautions: R shoulder      1st PT visit: 2/2/2022  Year of care end date: 2/2/2023  6 Month test  Performed: August 2022  12 Month test performed: February 2023  Mind body plan: Plan A - 8 months  Patient level: C    Time In: 9:40 AM  Time Out: 10:05 AM  Total Treatment Time: 25 mins    [facility]ness [facility] 2022  Handout on this week's wellness topic Play was provided along with a discussion on what it means, the benefits, and suggestions for practice.  Reviewed last week's topic of Workaholic.    Subjective:   Patient reports without back pain today. He did some walking and jogging.  He is active around his house preparing far renovations. He is learning .    Objective:   Royer completed therapeutic stretches (EIL, LOR) and the following MedX exercise machines: core lumbar, torso rotation l/r, leg extension, leg curl, upright row, chest press, biceps curl, triceps extension, leg press    See exercise log in patient folder for rate of exertion and repetitions completed.       Fitness Machine Education Key:  E=education on equipment initiated and further follow up and education needed  I=independent with  and exercise.  The patient:   Adjusts machines to his/her settings   Uses equipment levers, pins, weights safely   Maintains safe and correct posture while exercising   Moves through exercise with correct pace and control   Gets on and off equipment safely      Lumbar/Cervical Ext.  Torso Rotation E Leg Press    Leg Extension E Seated Leg Curl  Chest Press    Seated Row E Hip ADD  Hip ABD    Triceps Extension  Bicep Curl  Other:        Assessment:   Patient  tolerated Patient tolerated MedX Core Lumbar Strength and all other peripheral exercises without an increase in symptoms. Patient was already warmed up upon arrival, stretched, and skipped icing low back.     Plan:  Continue with established plan of care towards wellness goals.     Health  : Ashley Cardoso  7/11/2022

## 2022-07-25 ENCOUNTER — DOCUMENTATION ONLY (OUTPATIENT)
Dept: REHABILITATION | Facility: HOSPITAL | Age: 57
End: 2022-07-25
Payer: MEDICAID

## 2022-07-25 NOTE — PROGRESS NOTES
Health  Wellness Visit Note    Name: Royer Driscoll  Clinic Number: [id number]  Physician: Zulma Aj NP  Diagnosis: No diagnosis found.  Past Medical History:   Diagnosis Date    Allergy     Arthritis     back and shoulder pain    Chronic thoracic back pain     Vitamin D deficiency 2016     Visit Number: 28  Precautions: R shoulder      1st PT visit: 2/2/2022  Year of care end date: 2/2/2023  6 Month test  Performed: August 2022  12 Month test performed: February 2023  Mind body plan: Plan A - 8 months  Patient level: C    Time In: 11:58 AM  Time Out: 12:43 PM  Total Treatment Time: 45 mins    [facility] 2022  Handout on this week's wellness topic Scheduled Self-Date was provided along with a discussion on what it means, the benefits, and suggestions for practice.  Reviewed last week's topic of Play.    Subjective:   Patient reports with minimal back pain today. He pulled his groin over the weekend which is better but aggravated with the adductor machine; lowered the resistance.  May need to keep it lowered next visit.  Pt switching his wellness date to Thursday bc traveling to [place]. He has been busy doing yard work and active around his house preparing for renovations. He is learning .    Objective:   Royer completed therapeutic stretches (EIL, LOR) and the following MedX exercise machines: core lumbar, torso rotation l/r, leg extension, leg curl, upright row, chest press, biceps curl, triceps extension, leg press    See exercise log in patient folder for rate of exertion and repetitions completed.       Fitness Machine Education Key:  E=education on equipment initiated and further follow up and education needed  I=independent with  and exercise.  The patient:   Adjusts machines to his/her settings   Uses equipment levers, pins, weights safely   Maintains safe and correct posture while exercising   Moves through exercise with correct pace and control   Gets  on and off equipment safely      Lumbar/Cervical Ext.  Torso Rotation E Leg Press    Leg Extension E Seated Leg Curl  Chest Press E   Seated Row E Hip ADD E Hip ABD    Triceps Extension E Bicep Curl  Other:        Assessment:   Patient tolerated Patient tolerated MedX Core Lumbar Strength and all other peripheral exercises without an increase in symptoms. Patient was already warmed up on treadmill for 5 minutes, stretched, and skipped icing low back.     Plan:  Continue with established plan of care towards wellness goals.     Health  : Ashley Cardoso  7/25/2022

## 2022-08-04 ENCOUNTER — DOCUMENTATION ONLY (OUTPATIENT)
Dept: REHABILITATION | Facility: HOSPITAL | Age: 57
End: 2022-08-04
Payer: MEDICAID

## 2022-08-04 NOTE — PROGRESS NOTES
Health  Wellness Visit Note    Name: Royer Driscoll  Clinic Number: [id number]  Physician: Zulma Aj NP  Diagnosis: No diagnosis found.  Past Medical History:   Diagnosis Date    Allergy     Arthritis     back and shoulder pain    Chronic thoracic back pain     Vitamin D deficiency 2016     Visit Number: 29  Precautions: R shoulder      1st PT visit: 2/2/2022  Year of care end date: 2/2/2023 Plus 1 Free (coaches were unavailable 7/22)  6 Month test  Performed: August 2022  12 Month test performed: February 2023  Mind body plan: Plan A - 8 months  Patient level: B    Time In: 10:40 AM  Time Out: 11:28 AM  Total Treatment Time: 48  mins    Wellness [facility] 2022  Handout on this week's wellness topic of SMART Goals was provided along with a discussion on what it means, the benefits, and suggestions for practice.  Reviewed last week's topic of Scheduled Self-Dating.    Subjective:   Patient reports his back is at 2/3-10. Still healing from groin pain and will keep last few machines lowered as last weekend. He did not complete cardio or home exercises due to resting groin plus having his grandchildren with him and driving them back home this past week. He typically does his home exercises 2-3 times a week and plans to increase frequency now that he is not caring for his grandchildren over the summer. No ice or heat were completed this past week.     Patient went through healthy back years ago and is familiar with machines, was able to set up most independently while referring to the weight card. Some education still needed on adductor and leg press setup.    Objective:   Royer completed therapeutic stretches (EIL, LOR) and the following MedX exercise machines: core lumbar, torso rotation l/r, leg extension, leg curl, upright row, chest press, biceps curl, triceps extension, leg press    See exercise log in patient folder for rate of exertion and repetitions completed.       Fitness Machine Education  Key:  E=education on equipment initiated and further follow up and education needed  I=independent with  and exercise.  The patient:   Adjusts machines to his/her settings   Uses equipment levers, pins, weights safely   Maintains safe and correct posture while exercising   Moves through exercise with correct pace and control   Gets on and off equipment safely      Lumbar/Cervical Ext. I Torso Rotation I Leg Press E   Leg Extension I Seated Leg Curl I Chest Press I   Seated Row I Hip ADD E Hip ABD I   Triceps Extension I Bicep Curl E Other:        Assessment:   Patient tolerated Patient tolerated MedX Core Lumbar Strength and all other peripheral exercises without an increase in symptoms. Patient warmed up on treadmill for 5 minutes, stretched, and skipped icing low back.     Plan:  Continue with established plan of care towards wellness goals.     Health  : Tisha Bellamy  8/4/2022

## 2022-08-08 ENCOUNTER — DOCUMENTATION ONLY (OUTPATIENT)
Dept: REHABILITATION | Facility: HOSPITAL | Age: 57
End: 2022-08-08
Payer: MEDICAID

## 2022-08-08 NOTE — PROGRESS NOTES
Health  Wellness Visit Note    Name: Royer Driscoll  Clinic Number: [id number]  Physician: Zulma Aj NP  Diagnosis: No diagnosis found.  Past Medical History:   Diagnosis Date    Allergy     Arthritis     back and shoulder pain    Chronic thoracic back pain     Vitamin D deficiency 2016     Visit Number: 30  Precautions: R shoulder      1st PT visit: 2/2/2022  Year of care end date: 2/2/2023 Plus 1 Free (coaches were unavailable 7/22)  6 Month test  Performed: August 2022  12 Month test performed: February 2023  Mind body plan: Plan A - 8 months  Patient level: B    Time In: 10:55 AM  Time Out: 11:45 AM  Total Treatment Time: 50 mins    Wellness [facility] 2022  Handout on this week's wellness topic of Small Steps was provided along with a discussion on what it means, the benefits, and suggestions for practice.  Reviewed last week's topic of SMART Goals.    Subjective:   Patient reports without back pain today.  He is still healing from groin pain (pulled 2 weeks ago).  He will return to his regular exercise schedule Monday, next week; off schedule due to resting groin plus having his grandchildren with him.  No ice or heat were completed this past week.     Patient went through healthy back years ago and is familiar with machines, was able to set up most independently while referring to the weight card.     Objective:   Royer completed therapeutic stretches (EIL, LOR) and the following MedX exercise machines: core lumbar, torso rotation l/r, leg extension, leg curl, upright row, chest press, biceps curl, triceps extension, leg press    See exercise log in patient folder for rate of exertion and repetitions completed.       Fitness Machine Education Key:  E=education on equipment initiated and further follow up and education needed  I=independent with  and exercise.  The patient:   Adjusts machines to his/her settings   Uses equipment levers, pins, weights safely   Maintains safe and  correct posture while exercising   Moves through exercise with correct pace and control   Gets on and off equipment safely      Lumbar/Cervical Ext. I Torso Rotation I Leg Press E   Leg Extension I Seated Leg Curl I Chest Press I   Seated Row I Hip ADD E Hip ABD I   Triceps Extension I Bicep Curl E Other:        Assessment:   Patient tolerated Patient tolerated MedX Core Lumbar Strength and all other peripheral exercises without an increase in symptoms. Patient warmed up on treadmill for 5 minutes, stretched, and skipped icing low back.     Plan:  Continue with established plan of care towards wellness goals.     Health  : Ashley Cardoso  8/8/2022

## 2022-08-15 ENCOUNTER — DOCUMENTATION ONLY (OUTPATIENT)
Dept: REHABILITATION | Facility: HOSPITAL | Age: 57
End: 2022-08-15
Payer: MEDICAID

## 2022-08-15 NOTE — PROGRESS NOTES
Health  Wellness Visit Note    Name: Royer Driscoll  Clinic Number: [id number]  Physician: Zulma Aj NP  Diagnosis: No diagnosis found.  Past Medical History:   Diagnosis Date    Allergy     Arthritis     back and shoulder pain    Chronic thoracic back pain     Vitamin D deficiency 2016     Visit Number: 31  Precautions: R shoulder      1st PT visit: 2/2/2022  Year of care end date: 2/2/2023 Plus 1 Free (coaches were unavailable 7/22)  6 Month test  Performed: August 2022  12 Month test performed: February 2023  Mind body plan: Plan A - 8 months  Patient level: B    Time In: 11:18 AM  Time Out: 11:48 AM  Total Treatment Time: 30 mins    Wellness [facility] 2022  Handout on this week's wellness topic of Stages of Change was provided along with a discussion on what it means, the benefits, and suggestions for practice.  Reviewed last week's topic of Small Steps.    Subjective:   Patient arrived several minutes after appt time (ran into construction and traffic). He reports without back pain today.  He feels better after groin injury 3 weeks ago.  He returned to his regular exercise schedule last week. He is going to PT for neck at another clinic.  No ice or heat were completed this past week.     Patient went through healthy back years ago and is familiar with machines, was able to set up most independently while referring to the weight card.     Objective:   Royer completed therapeutic stretches (EIL, LOR) and the following MedX exercise machines: core lumbar, torso rotation l/r, leg extension, leg curl, upright row, chest press, biceps curl, triceps extension, leg press    See exercise log in patient folder for rate of exertion and repetitions completed.       Fitness Machine Education Key:  E=education on equipment initiated and further follow up and education needed  I=independent with  and exercise.  The patient:   Adjusts machines to his/her settings   Uses equipment levers, pins,  weights safely   Maintains safe and correct posture while exercising   Moves through exercise with correct pace and control   Gets on and off equipment safely      Lumbar/Cervical Ext. I Torso Rotation I Leg Press E   Leg Extension I Seated Leg Curl I Chest Press I   Seated Row I Hip ADD E Hip ABD I   Triceps Extension I Bicep Curl E Other:        Assessment:   Patient tolerated Patient tolerated MedX Core Lumbar Strength and all other peripheral exercises without an increase in symptoms. Patient SKIPPED (arrived late, traffic) warm up on treadmill for 5 minutes, stretched, and skipped icing low back.     Plan:  Continue with established plan of care towards wellness goals.     Health  : Ashley Cardoso  8/15/2022

## 2022-08-22 ENCOUNTER — DOCUMENTATION ONLY (OUTPATIENT)
Dept: REHABILITATION | Facility: HOSPITAL | Age: 57
End: 2022-08-22
Payer: MEDICAID

## 2022-08-22 NOTE — PROGRESS NOTES
Health  Wellness Visit Note    Name: Royer Driscoll  Clinic Number: [id number]  Physician: Zulma Aj NP  Diagnosis: No diagnosis found.  Past Medical History:   Diagnosis Date    Allergy     Arthritis     back and shoulder pain    Chronic thoracic back pain     Vitamin D deficiency 2016     Visit Number: 32  Precautions: R shoulder      1st PT visit: 2/2/2022  Year of care end date: 2/2/2023 Plus 1 Free (coaches were unavailable 7/22)  6 Month test  Performed: August 2022  12 Month test performed: February 2023  Mind body plan: Plan A - 8 months  Patient level: B    Time In: 11:00 AM  Time Out: 11:40 AM  Total Treatment Time: 40 mins    Wellness Vision 2022  Handout on this week's wellness topic of Journaling Goals was provided along with a discussion on what it means, the benefits, and suggestions for practice.  Reviewed last week's topic of Small Steps.    Subjective:   Patient tightness in back, especially after using the lumbar strength machine.  No pain, just tightness. His groin injury 4 weeks ago seems to be fully recovered.  He returned to his regular exercise schedule last week. He is going to PT for neck at another clinic.  No ice or heat were completed this past week.   Printed up some back stretches and strengthening exercises upon patient's request for at home exercises.    Objective:   Royer completed therapeutic stretches (EIL, LOR) and the following MedX exercise machines: core lumbar, torso rotation l/r, leg extension, leg curl, upright row, chest press, biceps curl, triceps extension, leg press    See exercise log in patient folder for rate of exertion and repetitions completed.       Fitness Machine Education Key:  E=education on equipment initiated and further follow up and education needed  I=independent with  and exercise.  The patient:   Adjusts machines to his/her settings   Uses equipment levers, pins, weights safely   Maintains safe and correct posture  while exercising   Moves through exercise with correct pace and control   Gets on and off equipment safely      Lumbar/Cervical Ext. I Torso Rotation I Leg Press E   Leg Extension I Seated Leg Curl I Chest Press I   Seated Row I Hip ADD E Hip ABD I   Triceps Extension I Bicep Curl E Other:        Assessment:   Patient tolerated Patient tolerated MedX Core Lumbar Strength and all other peripheral exercises without an increase in symptoms. Patient SKIPPED (arrived late, traffic) warm up on treadmill for 10 minutes, stretched before arrival, and skipped icing low back.     Plan:  Continue with established plan of care towards wellness goals.     Health  : Ashley Cardoso  8/22/2022

## 2022-08-29 ENCOUNTER — DOCUMENTATION ONLY (OUTPATIENT)
Dept: REHABILITATION | Facility: HOSPITAL | Age: 57
End: 2022-08-29
Payer: MEDICAID

## 2022-08-29 NOTE — PROGRESS NOTES
Health  Wellness Visit Note    Name: Royer Driscoll  Clinic Number: [id number]  Physician: Zulma Aj NP  Diagnosis: No diagnosis found.  Past Medical History:   Diagnosis Date    Allergy     Arthritis     back and shoulder pain    Chronic thoracic back pain     Vitamin D deficiency 2016     Visit Number: 33  Precautions: R shoulder      1st PT visit: 2/2/2022  Year of care end date: 2/2/2023 Plus 1 Free (coaches were unavailable 7/22)  6 Month test  Performed: August 2022  12 Month test performed: February 2023  Mind body plan: Plan A - 8 months  Patient level: B    Time In: 11:08 AM  Time Out: 11:45 AM  Total Treatment Time: 37 mins    Wellness Vision 2022  Handout on this week's wellness topic of Decisional Balance was provided along with a discussion on what it means, the benefits, and suggestions for practice.  Reviewed last week's topic of Journaling Goals.     Subjective:   Patient arrived for wellness already warmed up from yard work and stretching. .  He returned to some home exercise schedule last week. He is doing extensive  home repairs and going to PT for neck at another clinic.  No ice or heat were completed this past week.   Last week, HC printed up some back stretches and strengthening exercises upon patient's request for at home exercises.    Objective:   Royer completed therapeutic stretches (EIL, LOR) and the following MedX exercise machines: core lumbar, torso rotation l/r, leg extension, leg curl, upright row, chest press, biceps curl, triceps extension, leg press    See exercise log in patient folder for rate of exertion and repetitions completed.       Fitness Machine Education Key:  E=education on equipment initiated and further follow up and education needed  I=independent with  and exercise.  The patient:  Adjusts machines to his/her settings  Uses equipment levers, pins, weights safely  Maintains safe and correct posture while exercising  Moves through exercise  with correct pace and control  Gets on and off equipment safely      Lumbar/Cervical Ext. I Torso Rotation I Leg Press E   Leg Extension I Seated Leg Curl I Chest Press I   Seated Row I Hip ADD E Hip ABD I   Triceps Extension I Bicep Curl E Other:        Assessment:   Patient tolerated Patient tolerated MedX Core Lumbar Strength and all other peripheral exercises without an increase in symptoms. Patient SKIPPED warm up on treadmill, stretched before arrival, and skipped icing low back.     Plan:  Continue with established plan of care towards wellness goals.     Health  : Ashley Cardoso  8/29/2022

## 2022-09-07 ENCOUNTER — DOCUMENTATION ONLY (OUTPATIENT)
Dept: REHABILITATION | Facility: HOSPITAL | Age: 57
End: 2022-09-07
Payer: MEDICAID

## 2022-09-07 NOTE — PROGRESS NOTES
Health  Wellness Visit Note    Name: Royer Driscoll  Clinic Number: [id number]  Physician: Zulma Aj NP  Diagnosis: No diagnosis found.  Past Medical History:   Diagnosis Date    Allergy     Arthritis     back and shoulder pain    Chronic thoracic back pain     Vitamin D deficiency 2016     Visit Number: 34  Precautions: R shoulder      1st PT visit: 2/2/2022  Year of care end date: 2/2/2023 Plus 1 Free (coaches were unavailable 7/22)  6 Month test  Performed: August 2022  12 Month test performed: February 2023  Mind body plan: Plan A - 8 months  Patient level: B    Time In: 10:02 AM  Time Out:  10:48 AM  Total Treatment Time: 46 mins    [facility]ness [facility] 2022  Handout on this week's wellness topic of Body Image was provided along with a discussion on what it means, the benefits, and suggestions for practice.  Reviewed last week's topic of Decisional Balance.     Subjective:   Patient reports his back is about the same and is maintaining. He is continuing installing a drainage system in his back yard and is doing significant physical labor with that. He is continuing PT for neck at another clinic. No ice or heat were completed this past week. He is doing home exercises about 2x a week currently, but said that when he doesn't do as much physical labor around the house he does them more frequently.       Objective:   Royer completed therapeutic stretches (EIL, LOR) and the following MedX exercise machines: core lumbar, torso rotation l/r, leg extension, leg curl, upright row, chest press, biceps curl, triceps extension, leg press    See exercise log in patient folder for rate of exertion and repetitions completed.       Fitness Machine Education Key:  E=education on equipment initiated and further follow up and education needed  I=independent with  and exercise.  The patient:  Adjusts machines to his/her settings  Uses equipment levers, pins, weights safely  Maintains safe and correct posture  while exercising  Moves through exercise with correct pace and control  Gets on and off equipment safely      Lumbar/Cervical Ext. I Torso Rotation I Leg Press E   Leg Extension I Seated Leg Curl I Chest Press I   Seated Row I Hip ADD E Hip ABD I   Triceps Extension I Bicep Curl E Other:        Assessment:   Patient tolerated Patient tolerated MedX Core Lumbar Strength and all other peripheral exercises without an increase in symptoms. Patient warmed up on treadmill, stretched prior to workout.    Plan:  Continue with established plan of care towards wellness goals.     Health  : Tisha Bellamy  9/7/2022

## 2022-09-12 ENCOUNTER — DOCUMENTATION ONLY (OUTPATIENT)
Dept: REHABILITATION | Facility: HOSPITAL | Age: 57
End: 2022-09-12
Payer: MEDICAID

## 2022-09-12 NOTE — PROGRESS NOTES
Health  Wellness Visit Note    Name: Royer Driscoll  Clinic Number: [id number]  Physician: Zulma Aj NP  Diagnosis: No diagnosis found.  Past Medical History:   Diagnosis Date    Allergy     Arthritis     back and shoulder pain    Chronic thoracic back pain     Vitamin D deficiency 2016     Visit Number: 35  Precautions: R shoulder      1st PT visit: 2/2/2022  Year of care end date: 2/2/2023 Plus 1 Free (coaches were unavailable 7/22)  6 Month test  Performed: August 2022  12 Month test performed: February 2023  Mind body plan: Plan A - 8 months  Patient level: B    Time In: 11:05 AM  Time Out: 10:45 AM  Total Treatment Time: 40 mins    Wellness [facility] 2022  Handout on this week's wellness topic of Preventing Burnout was provided along with a discussion on what it means, the benefits, and suggestions for practice.  Reviewed last week's topic of Body Image.     Subjective:   Patient reports without back pain or tightness; feeling good. He is continuing installing a drainage system in his back yard and is doing significant physical labor around the house from [place]. He has been stretching and continuing PT for neck at another clinic. No ice or heat were completed this past week.     Objective:   Royer completed therapeutic stretches (EIL, LOR) and the following MedX exercise machines: core lumbar, torso rotation l/r, leg extension, leg curl, upright row, chest press, biceps curl, triceps extension, leg press    See exercise log in patient folder for rate of exertion and repetitions completed.       Fitness Machine Education Key:  E=education on equipment initiated and further follow up and education needed  I=independent with  and exercise.  The patient:  Adjusts machines to his/her settings  Uses equipment levers, pins, weights safely  Maintains safe and correct posture while exercising  Moves through exercise with correct pace and control  Gets on and off equipment  safely      Lumbar/Cervical Ext. I Torso Rotation I Leg Press E   Leg Extension I Seated Leg Curl I Chest Press I   Seated Row I Hip ADD E Hip ABD I   Triceps Extension I Bicep Curl E Other:        Assessment:   Patient tolerated Patient tolerated MedX Core Lumbar Strength and all other peripheral exercises without an increase in symptoms. Patient warmed up on treadmill, stretched prior to workout. Prefers to skip ice.    Plan:  Continue with established plan of care towards wellness goals.     Health  : Ashley Cardoso  9/12/2022

## 2022-09-19 ENCOUNTER — DOCUMENTATION ONLY (OUTPATIENT)
Dept: REHABILITATION | Facility: HOSPITAL | Age: 57
End: 2022-09-19
Payer: MEDICAID

## 2022-09-26 ENCOUNTER — DOCUMENTATION ONLY (OUTPATIENT)
Dept: REHABILITATION | Facility: HOSPITAL | Age: 57
End: 2022-09-26
Payer: MEDICAID

## 2022-09-26 NOTE — PROGRESS NOTES
Health  Wellness Visit Note    Name: Royer Driscoll  Clinic Number: [id number]  Physician: Zulma Aj NP  Diagnosis: No diagnosis found.  Past Medical History:   Diagnosis Date    Allergy     Arthritis     back and shoulder pain    Chronic thoracic back pain     Vitamin D deficiency 2016     Visit Number: 36  Precautions: R shoulder      1st PT visit: 2/2/2022  Year of care end date: 2/2/2023 Plus 1 Free (coaches were unavailable 7/22)  6 Month test  Performed: August 2022  12 Month test performed: February 2023  Mind body plan: Plan A - 8 months  Patient level: B    Time In: 10:52 AM  Time Out: 11:32 AM  Total Treatment Time: 40 mins    Wellness [facility] 2022  Handout on this week's wellness topic of Addiction was provided along with a discussion on what it means, the benefits, and suggestions for practice.  Reviewed last week's topic of Taking Ownership of Your Overall Wellness.     Subjective:   Patient reports with minimal back pain/ tightness after doing lots of shveling and push wheelbarrow this weekend. He is doing significant physical labor around the house from [place]. He has been stretching 4x per week, and continuing PT for neck at another clinic. No ice or heat were completed this past week.     Objective:   Royer completed therapeutic stretches (EIL, LOR) and the following MedX exercise machines: core lumbar, torso rotation l/r, leg extension, leg curl, upright row, chest press, biceps curl, triceps extension, leg press    See exercise log in patient folder for rate of exertion and repetitions completed.       Fitness Machine Education Key:  E=education on equipment initiated and further follow up and education needed  I=independent with  and exercise.  The patient:  Adjusts machines to his/her settings  Uses equipment levers, pins, weights safely  Maintains safe and correct posture while exercising  Moves through exercise with correct pace and control  Gets on and off  equipment safely      Lumbar/Cervical Ext. I Torso Rotation I Leg Press E   Leg Extension I Seated Leg Curl I Chest Press I   Seated Row I Hip ADD E Hip ABD I   Triceps Extension I Bicep Curl E Other:        Assessment:   Patient tolerated Patient tolerated MedX Core Lumbar Strength and all other peripheral exercises without an increase in symptoms. Patient warmed up on treadmill, stretched prior to workout. Prefers to skip ice.    Plan:  Continue with established plan of care towards wellness goals.     Health  : Ashley Cardoso  9/26/2022

## 2022-10-03 ENCOUNTER — DOCUMENTATION ONLY (OUTPATIENT)
Dept: REHABILITATION | Facility: HOSPITAL | Age: 57
End: 2022-10-03
Payer: MEDICAID

## 2022-10-03 NOTE — PROGRESS NOTES
Health  Wellness Visit Note    Name: Royer Driscoll  Clinic Number: [id number]  Physician: Zulma Aj NP  Diagnosis: No diagnosis found.  Past Medical History:   Diagnosis Date    Allergy     Arthritis     back and shoulder pain    Chronic thoracic back pain     Vitamin D deficiency 2016     Visit Number: 37  Precautions: R shoulder      1st PT visit: 2/2/2022  Year of care end date: 2/2/2023 Plus 1 Free (coaches were unavailable 7/22)  6 Month test  Performed: August 2022  12 Month test performed: February 2023  Mind body plan: Plan A - 8 months  Patient level: B    Time In: 11:00 AM  Time Out: 11:40 AM  Total Treatment Time: 40 mins    Wellness [facility] 2022  Handout on this week's wellness topic of Relationships was provided along with a discussion on what it means, the benefits, and suggestions for practice.  Reviewed last week's topic of Addiction.     Subjective:   Patient reports with no back pain/ tightness.  He rested yesterday (Sunday) after doing lots of shveling and push wheelbarrow on Saturday. He is doing significant physical labor around the house from [place]. He has been stretching as needed and continuing PT for neck at another clinic. No ice or heat were completed this past week.     Objective:   Royer completed therapeutic stretches (EIL, LOR) and the following MedX exercise machines: core lumbar, torso rotation l/r, leg extension, leg curl, upright row, chest press, biceps curl, triceps extension, leg press    See exercise log in patient folder for rate of exertion and repetitions completed.       Fitness Machine Education Key:  E=education on equipment initiated and further follow up and education needed  I=independent with  and exercise.  The patient:  Adjusts machines to his/her settings  Uses equipment levers, pins, weights safely  Maintains safe and correct posture while exercising  Moves through exercise with correct pace and control  Gets on and off equipment  safely      Lumbar/Cervical Ext. I Torso Rotation I Leg Press E   Leg Extension I Seated Leg Curl I Chest Press I   Seated Row I Hip ADD E Hip ABD I   Triceps Extension I Bicep Curl E Other:        Assessment:   Patient tolerated Patient tolerated MedX Core Lumbar Strength and all other peripheral exercises without an increase in symptoms. Patient warmed up on treadmill, stretched prior to workout. Prefers to skip ice.    Plan:  Continue with established plan of care towards wellness goals.     Health  : Ashley Cardoso  10/3/2022

## 2022-10-11 ENCOUNTER — DOCUMENTATION ONLY (OUTPATIENT)
Dept: REHABILITATION | Facility: HOSPITAL | Age: 57
End: 2022-10-11
Payer: MEDICAID

## 2022-10-11 NOTE — PROGRESS NOTES
Health  Wellness Visit Note    Name: Royer Driscoll  Clinic Number: [id number]  Physician: Zulma Aj NP  Diagnosis: No diagnosis found.  Past Medical History:   Diagnosis Date    Allergy     Arthritis     back and shoulder pain    Chronic thoracic back pain     Vitamin D deficiency 2016     Visit Number: 39  Precautions: R shoulder      1st PT visit: 2/2/2022  Year of care end date: 2/2/2023 Plus 1 Free (coaches were unavailable 7/22)  6 Month test  Performed: August 2022  12 Month test performed: February 2023  Mind body plan: Plan A - 8 months  Patient level: B    Time In: 11:04 AM  Time Out: 11:44 AM  Total Treatment Time: 40 mins    Wellness Vision 2022  Handout on this week's wellness topic of Communication was provided along with a discussion on what it means, the benefits, and suggestions for practice.  Reviewed last week's topic of Relationships.     Subjective:   Patient reports with no back pain/ tightness.  He arrived for wellness immediately after his shoulder rehab session.  He was warmed up. He is doing significant physical labor around the house from [place]. He has been stretching as needed and continuing PT for neck at another clinic. He did ice his upper back /shoulders after going through the machines.       Objective:   Royer completed therapeutic stretches (EIL, LOR) and the following MedX exercise machines: core lumbar, torso rotation l/r, leg extension, leg curl, upright row, chest press, biceps curl, triceps extension, leg press    See exercise log in patient folder for rate of exertion and repetitions completed.       Fitness Machine Education Key:  E=education on equipment initiated and further follow up and education needed  I=independent with  and exercise.  The patient:  Adjusts machines to his/her settings  Uses equipment levers, pins, weights safely  Maintains safe and correct posture while exercising  Moves through exercise with correct pace and  control  Gets on and off equipment safely      Lumbar/Cervical Ext. I Torso Rotation I Leg Press E   Leg Extension I Seated Leg Curl I Chest Press I   Seated Row I Hip ADD E Hip ABD I   Triceps Extension I Bicep Curl E Other:        Assessment:   Patient tolerated Patient tolerated MedX Core Lumbar Strength and all other peripheral exercises without an increase in symptoms. Patient warmed up on treadmill, stretched prior to workout. Prefers to skip ice, typically, but iced today.    Plan:  Continue with established plan of care towards wellness goals.     Health  : Ashley Cardoso  10/11/2022

## 2022-10-17 ENCOUNTER — DOCUMENTATION ONLY (OUTPATIENT)
Dept: REHABILITATION | Facility: HOSPITAL | Age: 57
End: 2022-10-17
Attending: GENERAL PRACTICE
Payer: MEDICAID

## 2022-10-17 NOTE — PROGRESS NOTES
Health  Wellness Visit Note    Name: Royer Driscoll  Clinic Number: [id number]  Physician: No ref. provider found  Diagnosis: No diagnosis found.  Past Medical History:   Diagnosis Date    Allergy     Arthritis     back and shoulder pain    Chronic thoracic back pain     Vitamin D deficiency 2016     Visit Number: 40  Precautions: R shoulder      1st PT visit: 2/2/2022  Year of care end date: 2/2/2023 Plus 1 Free (coaches were unavailable 7/22)  6 Month test  Performed: August 2022  12 Month test performed: February 2023  Mind body plan: Plan A - 8 months  Patient level: B    Time In: 11:06 AM  Time Out: 11:51 AM  Total Treatment Time: 45 mins    Wellness [facility] 2022  Handout on this week's wellness topic of Self-Assessment was provided along with a discussion on what it means, the benefits, and suggestions for practice.  Reviewed last week's topic of Communication.     Subjective:   Patient reports with no back pain/tightness, but his left knee is bothering him.  He thinks it may be the meniscus. He is doing physical labor around the house from [place]. He has been stretching as needed and continuing PT for neck at another clinic. He was on vacation in [place] this weekend.       Objective:   Royer completed therapeutic stretches (EIL, LOR) and the following MedX exercise machines: core lumbar, torso rotation l/r, leg extension, leg curl, upright row, chest press, biceps curl, triceps extension, leg press    See exercise log in patient folder for rate of exertion and repetitions completed.       Fitness Machine Education Key:  E=education on equipment initiated and further follow up and education needed  I=independent with  and exercise.  The patient:  Adjusts machines to his/her settings  Uses equipment levers, pins, weights safely  Maintains safe and correct posture while exercising  Moves through exercise with correct pace and control  Gets on and off equipment safely      Lumbar/Cervical  Ext. I Torso Rotation I Leg Press E   Leg Extension I Seated Leg Curl I Chest Press I   Seated Row I Hip ADD E Hip ABD I   Triceps Extension I Bicep Curl E Other:        Assessment:   Patient tolerated Patient tolerated MedX Core Lumbar Strength and all other peripheral exercises without an increase in symptoms. Patient warmed up on treadmill, stretched prior to workout. Prefers to skip ice, typically.    Plan:  Continue with established plan of care towards wellness goals.     Health  : Ashley Cardoso  10/17/2022

## 2022-10-24 ENCOUNTER — DOCUMENTATION ONLY (OUTPATIENT)
Dept: REHABILITATION | Facility: HOSPITAL | Age: 57
End: 2022-10-24
Payer: MEDICAID

## 2022-10-24 NOTE — PROGRESS NOTES
Health  Wellness Visit Note    Name: Royer Driscoll  Clinic Number: [id number]  Physician: Zulma Aj NP  Diagnosis: No diagnosis found.  Past Medical History:   Diagnosis Date    Allergy     Arthritis     back and shoulder pain    Chronic thoracic back pain     Vitamin D deficiency 2016     Visit Number: 41  Precautions: R shoulder      1st PT visit: 2/2/2022  Year of care end date: 2/2/2023 Plus 1 Free (coaches were unavailable 7/22)  6 Month test  Performed: August 2022  12 Month test performed: February 2023  Mind body plan: Plan A - 8 months  Patient level: B    Time In: 11:03 AM  Time Out: 11:53 AM  Total Treatment Time: 50 mins    Wellness Vision 2022  Handout on this week's wellness topic of Boundaries was provided along with a discussion on what it means, the benefits, and suggestions for practice.  Reviewed last week's topic of Self-Assessment.     Subjective:   Patient reports with no back pain/tightness.  Completed the 6 month Medx test today, but data is in 2 accounts.  Therapist will work to merge the 2 accounts.His knee has been bothering him; thinks it may be the meniscus. He is doing physical labor around the house from [place]. He has been stretching as needed and continuing PT for neck at another clinic.     Objective:   Royer completed therapeutic stretches (EIL, LOR) and the following MedX exercise machines: core lumbar, torso rotation l/r, leg extension, leg curl, upright row, chest press, biceps curl, triceps extension, leg press    See exercise log in patient folder for rate of exertion and repetitions completed.       Fitness Machine Education Key:  E=education on equipment initiated and further follow up and education needed  I=independent with  and exercise.  The patient:  Adjusts machines to his/her settings  Uses equipment levers, pins, weights safely  Maintains safe and correct posture while exercising  Moves through exercise with correct pace and  control  Gets on and off equipment safely      Lumbar/Cervical Ext. I Torso Rotation I Leg Press E   Leg Extension I Seated Leg Curl I Chest Press I   Seated Row I Hip ADD E Hip ABD I   Triceps Extension I Bicep Curl E Other:        Assessment:   Patient tolerated Patient tolerated MedX Core Lumbar Strength and all other peripheral exercises without an increase in symptoms. Patient warmed up on treadmill, stretched prior to workout. Prefers to skip ice, typically.    Plan:  Continue with established plan of care towards wellness goals.     Health  : Ashley Cardoso  10/24/2022

## 2022-10-31 ENCOUNTER — DOCUMENTATION ONLY (OUTPATIENT)
Dept: REHABILITATION | Facility: HOSPITAL | Age: 57
End: 2022-10-31
Payer: MEDICAID

## 2022-10-31 NOTE — PROGRESS NOTES
Health  Wellness Visit Note    Name: Royer Driscoll  Clinic Number: [id number]  Physician: Zulma Aj NP  Diagnosis: No diagnosis found.  Past Medical History:   Diagnosis Date    Allergy     Arthritis     back and shoulder pain    Chronic thoracic back pain     Vitamin D deficiency 2016     Visit Number: 42  Precautions: R shoulder      1st PT visit: 2/2/2022  Year of care end date: 2/2/2023 Plus 1 Free (coaches were unavailable 7/22)  6 Month test  Performed: August 2022  12 Month test performed: February 2023  Mind body plan: Plan A - 8 months  Patient level: B    Time In: 10:58 AM  Time Out: 11:48 AM  Total Treatment Time: 50 mins    Wellness Vision 2022  Handout on this week's wellness topic of Sleep Requirements was provided along with a discussion on what it means, the benefits, and suggestions for practice.  Reviewed last week's topic of Boundaries.     Subjective:   Patient reports with no back pain/tightness.  Completed the 6 month Medx test last visit. No soreness after. His knee was been bothering him; but it seems to be better. He is doing physical labor around the house from [place]. He has been stretching as needed and continuing PT for neck at another clinic.     Increased resistance on most machines today.    Objective:   Royer completed therapeutic stretches (EIL, LOR) and the following MedX exercise machines: core lumbar, torso rotation l/r, leg extension, leg curl, upright row, chest press, biceps curl, triceps extension, leg press    See exercise log in patient folder for rate of exertion and repetitions completed.       Fitness Machine Education Key:  E=education on equipment initiated and further follow up and education needed  I=independent with  and exercise.  The patient:  Adjusts machines to his/her settings  Uses equipment levers, pins, weights safely  Maintains safe and correct posture while exercising  Moves through exercise with correct pace and  control  Gets on and off equipment safely      Lumbar/Cervical Ext. I Torso Rotation I Leg Press E   Leg Extension I Seated Leg Curl I Chest Press I   Seated Row I Hip ADD I Hip ABD I   Triceps Extension I Bicep Curl I Other:        Assessment:   Patient tolerated Patient tolerated MedX Core Lumbar Strength and all other peripheral exercises without an increase in symptoms. Patient warmed up on treadmill, stretched prior to workout. Prefers to skip ice, typically.    Plan:  Continue with established plan of care towards wellness goals.     Health  : Ashley Cardoso  10/31/2022

## 2022-11-08 ENCOUNTER — DOCUMENTATION ONLY (OUTPATIENT)
Dept: REHABILITATION | Facility: HOSPITAL | Age: 57
End: 2022-11-08
Payer: MEDICAID

## 2022-11-08 NOTE — PROGRESS NOTES
Health  Wellness Visit Note    Name: Royer Driscoll  Clinic Number: [id number]  Physician: Zulma Aj NP  Diagnosis: No diagnosis found.  Past Medical History:   Diagnosis Date    Allergy     Arthritis     back and shoulder pain    Chronic thoracic back pain     Vitamin D deficiency 2016     Visit Number: 43  Precautions: R shoulder      1st PT visit: 2/2/2022  Year of care end date: 2/2/2023 Plus 1 Free (coaches were unavailable 7/22)  6 Month test  Performed: August 2022  12 Month test performed: February 2023  Mind body plan: Plan A - 8 months  Patient level: B    Time In: 9:58 AM  Time Out: 10:43 AM  Total Treatment Time:  45 mins    Wellness Vision 2022  Handout on this week's wellness topic of Sleep Positions was provided along with a discussion on what it means, the benefits, and suggestions for practice.  Reviewed last week's topic of Sleep Requirements.     Subjective:   Patient reports with no back pain/tightness today, but he had a mild week physically.  Increased resistance on several machines last week. No soreness noted afterwards. His knee bothers him depending upon activity level.     Objective:   Royer completed therapeutic stretches (EIL, LOR) and the following MedX exercise machines: core lumbar, torso rotation l/r, leg extension, leg curl, upright row, chest press, biceps curl, triceps extension, leg press    See exercise log in patient folder for rate of exertion and repetitions completed.       Fitness Machine Education Key:  E=education on equipment initiated and further follow up and education needed  I=independent with  and exercise.  The patient:  Adjusts machines to his/her settings  Uses equipment levers, pins, weights safely  Maintains safe and correct posture while exercising  Moves through exercise with correct pace and control  Gets on and off equipment safely      Lumbar/Cervical Ext. I Torso Rotation I Leg Press E   Leg Extension I Seated Leg Curl I Chest  Press I   Seated Row I Hip ADD I Hip ABD I   Triceps Extension I Bicep Curl I Other:        Assessment:   Patient tolerated Patient tolerated MedX Core Lumbar Strength and all other peripheral exercises without an increase in symptoms. Patient warmed up on treadmill, stretched prior to workout. Prefers to skip ice, typically.    Plan:  Continue with established plan of care towards wellness goals.     Health  : Ashley Cardoso  11/8/2022

## 2022-11-15 ENCOUNTER — DOCUMENTATION ONLY (OUTPATIENT)
Dept: REHABILITATION | Facility: HOSPITAL | Age: 57
End: 2022-11-15
Payer: MEDICAID

## 2022-11-15 NOTE — PROGRESS NOTES
Health  Wellness Visit Note    Name: Royer Driscoll  Clinic Number: [id number]  Physician: Zulma Aj NP  Diagnosis: No diagnosis found.  Past Medical History:   Diagnosis Date    Allergy     Arthritis     back and shoulder pain    Chronic thoracic back pain     Vitamin D deficiency 2016     Visit Number: 43  Precautions: R shoulder      1st PT visit: 2/2/2022  Year of care end date: 2/2/2023 Plus 1 Free (coaches were unavailable 7/22)  6 Month test  Performed: August 2022  12 Month test performed: February 2023  Mind body plan: Plan A - 8 months  Patient level: B    Time In: 10:00 AM  Time Out: 10:45 AM  Total Treatment Time: 45 mins    [facility] 2022  Handout on this week's wellness topic of Sleep Journal was provided along with a discussion on what it means, the benefits, and suggestions for practice.  Reviewed last week's topic of Sleep Positions.      Subjective:   Patient reports with no back pain/tightness today, but he had a mild week physically. Family in town to visit. Increased resistance on several machines 2 weeks ago. No soreness noted afterwards. His knee bothers him depending upon activity level.     Objective:   Royer completed therapeutic stretches (EIL, LOR) and the following MedX exercise machines: core lumbar, torso rotation l/r, leg extension, leg curl, upright row, chest press, biceps curl, triceps extension, leg press    See exercise log in patient folder for rate of exertion and repetitions completed.       Fitness Machine Education Key:  E=education on equipment initiated and further follow up and education needed  I=independent with  and exercise.  The patient:  Adjusts machines to his/her settings  Uses equipment levers, pins, weights safely  Maintains safe and correct posture while exercising  Moves through exercise with correct pace and control  Gets on and off equipment safely      Lumbar/Cervical Ext. I Torso Rotation I Leg Press E   Leg Extension I  Seated Leg Curl I Chest Press I   Seated Row I Hip ADD I Hip ABD I   Triceps Extension I Bicep Curl I Other:        Assessment:   Patient tolerated Patient tolerated MedX Core Lumbar Strength and all other peripheral exercises without an increase in symptoms. Patient warmed up on treadmill, stretched prior to workout. Prefers to skip ice, typically.    Plan:  Continue with established plan of care towards wellness goals.     Health  : Ashley Cardoso  11/15/2022

## 2022-11-21 ENCOUNTER — DOCUMENTATION ONLY (OUTPATIENT)
Dept: REHABILITATION | Facility: HOSPITAL | Age: 57
End: 2022-11-21
Attending: NURSE PRACTITIONER
Payer: MEDICAID

## 2022-11-21 NOTE — PROGRESS NOTES
Health  Wellness Visit Note    Name: Royer Driscoll  Clinic Number: [id number]  Physician: Zulma Aj NP  Diagnosis: No diagnosis found.  Past Medical History:   Diagnosis Date    Allergy     Arthritis     back and shoulder pain    Chronic thoracic back pain     Vitamin D deficiency 2016     Visit Number: 45  Precautions: R shoulder      1st PT visit: 2/2/2022  Year of care end date: 2/2/2023 Plus 1 Free (coaches were unavailable 7/22)  6 Month test  Performed: August 2022  12 Month test performed: February 2023  Mind body plan: Plan A - 8 months  Patient level: B    Time In: 12:31 PM  Time Out: 1:15 AM  Total Treatment Time: 44 mins    [facility] 2022  Handout on this week's wellness topic of Sleep Conditions and Tips was provided along with a discussion on what it means, the benefits, and suggestions for practice.  Reviewed last week's topic of Sleep Journal.      Subjective:   Patient reports with no back pain/tightness today.  Family in town to visit and traveling to [place] for Thanksgiving at his daughter's house. He is ready to begin including more resistance exercises into his weekly routine.  made a copy of his wellness weight card.     Objective:   Royer completed therapeutic stretches (EIL, LOR) and the following MedX exercise machines: core lumbar, torso rotation l/r, leg extension, leg curl, upright row, chest press, biceps curl, triceps extension, leg press    See exercise log in patient folder for rate of exertion and repetitions completed.       Fitness Machine Education Key:  E=education on equipment initiated and further follow up and education needed  I=independent with  and exercise.  The patient:  Adjusts machines to his/her settings  Uses equipment levers, pins, weights safely  Maintains safe and correct posture while exercising  Moves through exercise with correct pace and control  Gets on and off equipment safely      Lumbar/Cervical Ext. I Torso Rotation I  Leg Press E   Leg Extension I Seated Leg Curl I Chest Press I   Seated Row I Hip ADD I Hip ABD I   Triceps Extension I Bicep Curl I Other:        Assessment:   Patient tolerated Patient tolerated MedX Core Lumbar Strength and all other peripheral exercises without an increase in symptoms. Patient warmed up on treadmill, stretched prior to workout. Prefers to skip ice, typically.    Plan:  Continue with established plan of care towards wellness goals.     Health  : Ashley Cardoso  11/21/2022

## 2022-11-29 ENCOUNTER — DOCUMENTATION ONLY (OUTPATIENT)
Dept: REHABILITATION | Facility: HOSPITAL | Age: 57
End: 2022-11-29
Payer: MEDICAID

## 2022-11-29 NOTE — PROGRESS NOTES
Health  Wellness Visit Note    Name: Royer Driscoll  Clinic Number: [id number]  Physician: Zulma Aj NP  Diagnosis: No diagnosis found.  Past Medical History:   Diagnosis Date    Allergy     Arthritis     back and shoulder pain    Chronic thoracic back pain     Vitamin D deficiency 2016     Visit Number: 46  Precautions: R shoulder      1st PT visit: 2/2/2022  Year of care end date: 2/2/2023 Plus 1 Free (coaches were unavailable 7/22)  6 Month test  Performed: August 2022  12 Month test performed: February 2023  Mind body plan: Plan A - 8 months  Patient level: B    Time In: 10:04 AM  Time Out: 10:54 AM  Total Treatment Time: 50 mins    [facility] 2022  Handout on this week's wellness topic of Move More, Sleep Better was provided along with a discussion on what it means, the benefits, and suggestions for practice.  Reviewed last week's topic of Sleep Conditions and Tips.      Subjective:   Patient reports with moderate (6/10) back pain/tightness today. Has a little tweak in the middle lower back from the long drive to [place] for Thanksgiving. He will rest his back a bit until the flare up calms down. He has a long drive to [place],  asked about lumbar roll and doing a stretch at rest stops. He'll be ready to begin including more resistance exercises into his weekly routine soon.  made a copy of his wellness weight card last session.     Objective:   Royer completed therapeutic stretches (EIL, LOR) and the following MedX exercise machines: core lumbar, torso rotation l/r, leg extension, leg curl, upright row, chest press, biceps curl, triceps extension, leg press    See exercise log in patient folder for rate of exertion and repetitions completed.       Fitness Machine Education Key:  E=education on equipment initiated and further follow up and education needed  I=independent with  and exercise.  The patient:  Adjusts machines to his/her settings  Uses equipment levers, pins,  weights safely  Maintains safe and correct posture while exercising  Moves through exercise with correct pace and control  Gets on and off equipment safely      Lumbar/Cervical Ext. I Torso Rotation I Leg Press E   Leg Extension I Seated Leg Curl I Chest Press I   Seated Row I Hip ADD I Hip ABD I   Triceps Extension I Bicep Curl I Other:        Assessment:   Patient tolerated Patient tolerated MedX Core Lumbar Strength and all other peripheral exercises without an increase in symptoms. Patient warmed up on treadmill, stretched prior to workout. Prefers to skip ice, typically.    Plan:  Continue with established plan of care towards wellness goals.     Health  : Ashley Cardoso  11/29/2022

## 2022-12-07 ENCOUNTER — DOCUMENTATION ONLY (OUTPATIENT)
Dept: REHABILITATION | Facility: HOSPITAL | Age: 57
End: 2022-12-07
Payer: MEDICAID

## 2022-12-07 NOTE — PROGRESS NOTES
Health  Wellness Visit Note    Name: Royer Driscoll  Clinic Number: [id number]  Physician: Zulma Aj NP  Diagnosis: No diagnosis found.  Past Medical History:   Diagnosis Date    Allergy     Arthritis     back and shoulder pain    Chronic thoracic back pain     Vitamin D deficiency 2016     Visit Number: 47  Precautions: R shoulder      1st PT visit: 2/2/2022  Year of care end date: 2/2/2023  6 Month test  Performed: 10/24/2022  12 Month test performed: February 2023  Mind body plan: Plan A - 8 months, ends 2/7/2022 (+1 free)  Patient level: B    Time In: 9:04 AM  Time Out: 9:54 AM  Total Treatment Time: 50 mins    [facility] 2022  Handout on this week's wellness topic of Stress was provided along with a discussion on what it means, the benefits, and suggestions for practice.  Reviewed last week's topic of Move More, Sleep Better.      Subjective:   Patient reports without back pain/tightness today. He typically feels a little tightness after completing the Lumbar strength machine. He has a long drive to [place] for [place].  HC asked about lumbar roll and doing a stretch at rest stops. He'll be ready to begin including more resistance exercises into his weekly routine soon.     Objective:   Royer completed therapeutic stretches (EIL, LOR) and the following MedX exercise machines: core lumbar, torso rotation l/r, leg extension, leg curl, upright row, chest press, biceps curl, triceps extension, leg press    See exercise log in patient folder for rate of exertion and repetitions completed.       Fitness Machine Education Key:  E=education on equipment initiated and further follow up and education needed  I=independent with  and exercise.  The patient:  Adjusts machines to his/her settings  Uses equipment levers, pins, weights safely  Maintains safe and correct posture while exercising  Moves through exercise with correct pace and control  Gets on and off equipment  safely      Lumbar/Cervical Ext. I Torso Rotation I Leg Press E   Leg Extension I Seated Leg Curl I Chest Press I   Seated Row I Hip ADD I Hip ABD I   Triceps Extension I Bicep Curl I Other:        Assessment:   Patient tolerated Patient tolerated MedX Core Lumbar Strength and all other peripheral exercises without an increase in symptoms. Patient warmed up on treadmill, stretched prior to workout. Prefers to skip ice, typically.    Plan:  Continue with established plan of care towards wellness goals.     Health  : Ashley Cardoso  12/7/2022

## 2022-12-13 ENCOUNTER — DOCUMENTATION ONLY (OUTPATIENT)
Dept: REHABILITATION | Facility: HOSPITAL | Age: 57
End: 2022-12-13
Payer: MEDICAID

## 2022-12-13 NOTE — PROGRESS NOTES
Health  Wellness Visit Note    Name: Royer Driscoll  Clinic Number: [id number]  Physician: Zulma Aj NP  Diagnosis: No diagnosis found.  Past Medical History:   Diagnosis Date    Allergy     Arthritis     back and shoulder pain    Chronic thoracic back pain     Vitamin D deficiency 2016     Visit Number: 48  Precautions: R shoulder      1st PT visit: 2/2/2022  Year of care end date: 2/2/2023  6 Month test  Performed: 10/24/2022  12 Month test performed: February 2023  Mind body plan: Plan A - 8 months, ends 2/7/2022 (+1 free)  Patient level: B    Time In: 10:04 AM  Time Out: 10:54 AM  Total Treatment Time: 50 mins    [facility] 2022  Handout on this week's wellness topic of Coping was provided along with a discussion on what it means, the benefits, and suggestions for practice.  Reviewed last week's topic of Stress.      Subjective:   Patient reports without back pain/tightness today. He typically feels a little tightness after completing the Lumbar strength machine. He will miss next week, has a long drive to [place] for [place]. HC asked about lumbar roll and doing a stretch at rest stops. He'll be ready to begin including more resistance exercises into his weekly routine starting January.     Objective:   Royer completed therapeutic stretches (EIL, LOR) and the following MedX exercise machines: core lumbar, torso rotation l/r, leg extension, leg curl, upright row, chest press, biceps curl, triceps extension, leg press    See exercise log in patient folder for rate of exertion and repetitions completed.       Fitness Machine Education Key:  E=education on equipment initiated and further follow up and education needed  I=independent with  and exercise.  The patient:  Adjusts machines to his/her settings  Uses equipment levers, pins, weights safely  Maintains safe and correct posture while exercising  Moves through exercise with correct pace and control  Gets on and off equipment  safely      Lumbar/Cervical Ext. I Torso Rotation I Leg Press E   Leg Extension I Seated Leg Curl I Chest Press I   Seated Row I Hip ADD I Hip ABD I   Triceps Extension I Bicep Curl I Other:        Assessment:   Patient tolerated Patient tolerated MedX Core Lumbar Strength and all other peripheral exercises without an increase in symptoms. Patient warmed up on treadmill, stretched prior to workout. Prefers to skip ice, typically.    Plan:  Continue with established plan of care towards wellness goals.     Health  : Ashley Cardoso  12/13/2022

## 2023-01-05 ENCOUNTER — DOCUMENTATION ONLY (OUTPATIENT)
Dept: REHABILITATION | Facility: HOSPITAL | Age: 58
End: 2023-01-05
Payer: MEDICAID

## 2023-01-05 NOTE — PROGRESS NOTES
Health  Wellness Visit Note    Name: Royre Driscoll  Clinic Number: [id number]  Physician: Zulma Aj NP  Diagnosis: No diagnosis found.  Past Medical History:   Diagnosis Date    Allergy     Arthritis     back and shoulder pain    Chronic thoracic back pain     Vitamin D deficiency 2016     Visit Number: 49  Precautions: R shoulder      1st PT visit: 2/2/2022  Year of care end date: 2/2/2023  6 Month test  Performed: 10/24/2022  12 Month test performed: February 2023  Mind body plan: Plan A - 8 months, ends 2/7/2022 (+1 free)  Patient level: B    Time In: 9:04 AM  Time Out: 9:58 AM  Total Treatment Time: 54 mins    [facility] 2022  Handout on this week's wellness topic of Gratitude was provided along with a discussion on what it means, the benefits, and suggestions for practice.  Reviewed last week's topic of NA-missed 2 weeks bc of travel to [place] and [place] for the Holidays.      Subjective:   Patient reports with some back tightness today, due to the long time in the car recently. He typically feels a little tightness after completing the Lumbar strength machine. He'll be ready to begin including more resistance exercises into his weekly routine starting January.   JOL[facility] ends 2/7/2023    Objective:   Royer completed therapeutic stretches (EIL, LOR) and the following MedX exercise machines: core lumbar, torso rotation l/r, leg extension, leg curl, upright row, chest press, biceps curl, triceps extension, leg press    See exercise log in patient folder for rate of exertion and repetitions completed.       Fitness Machine Education Key:  E=education on equipment initiated and further follow up and education needed  I=independent with  and exercise.  The patient:  Adjusts machines to his/her settings  Uses equipment levers, pins, weights safely  Maintains safe and correct posture while exercising  Moves through exercise with correct pace and control  Gets on and off equipment  safely      Lumbar/Cervical Ext. I Torso Rotation I Leg Press E   Leg Extension I Seated Leg Curl I Chest Press I   Seated Row I Hip ADD I Hip ABD I   Triceps Extension I Bicep Curl I Other:        Assessment:   Patient tolerated Patient tolerated MedX Core Lumbar Strength and all other peripheral exercises without an increase in symptoms. Patient warmed up on treadmill, stretched prior to workout. Prefers to skip ice, typically, but iced back today bc of tightness.    Plan:  Continue with established plan of care towards wellness goals.     Health  : Ashley Cardoso  1/5/2023

## 2023-01-12 ENCOUNTER — DOCUMENTATION ONLY (OUTPATIENT)
Dept: REHABILITATION | Facility: HOSPITAL | Age: 58
End: 2023-01-12
Payer: MEDICAID

## 2023-01-12 NOTE — PROGRESS NOTES
Health  Wellness Visit Note    Name: Royer Driscoll  Clinic Number: [id number]  Physician: Zulma Aj NP  Diagnosis: No diagnosis found.  Past Medical History:   Diagnosis Date    Allergy     Arthritis     back and shoulder pain    Chronic thoracic back pain     Vitamin D deficiency 2016     Visit Number: 50  Precautions: R shoulder      1st PT visit: 2/2/2022  Year of care end date: 2/2/2023  6 Month test  Performed: 10/24/2022  12 Month test performed: February 2023  Mind body plan: Plan A - 8 months, ends 2/7/2022 (+1 free)  Patient level: B    Time In: 10:00 AM  Time Out: 10:47  AM  Total Treatment Time: 47 Minutes    [facility] 2022  Handout on this week's wellness topic of Get Outdoors was provided along with a discussion on what it means, the benefits, and suggestions for practice.  Reviewed last week's topic of Gratitude.    Subjective:   Patient reports he has no back pain today. Over the weekend he flew to [place] and has been very busy with the holidays.During today's Wellness session patient and HC talked about future plans for exercise considering his JOLENE approaching. Patient has a home gym including: treadmill, a recumbent bike, barbell bench press, dumbell-free weights, jump-rope and more. Within the last week he hasnt been as active as he usually is. Today he plans to map out his schedule to find time to workout.     J[facility] ends 2/7/2023    Objective:   Royer completed therapeutic stretches (EIL, LOR) and the following MedX exercise machines: core lumbar, torso rotation l/r, leg extension, leg curl, upright row, chest press, biceps curl, triceps extension, leg press    See exercise log in patient folder for rate of exertion and repetitions completed.       Fitness Machine Education Key:  E=education on equipment initiated and further follow up and education needed  I=independent with  and exercise.  The patient:  Adjusts machines to his/her settings  Uses equipment levers,  pins, weights safely  Maintains safe and correct posture while exercising  Moves through exercise with correct pace and control  Gets on and off equipment safely      Lumbar/Cervical Ext. I Torso Rotation I Leg Press E   Leg Extension I Seated Leg Curl I Chest Press I   Seated Row I Hip ADD I Hip ABD I   Triceps Extension I Bicep Curl I Other:        Assessment:   Patient tolerated Patient tolerated MedX Core Lumbar Strength and all other peripheral exercises without an increase in symptoms. Patient warmed up on treadmill, stretched prior to workout. Patient skipped ice today.     Plan:  Continue with established plan of care towards wellness goals.     Health  : Catina Haskins  1/12/2023

## 2023-01-19 ENCOUNTER — DOCUMENTATION ONLY (OUTPATIENT)
Dept: REHABILITATION | Facility: HOSPITAL | Age: 58
End: 2023-01-19
Payer: MEDICAID

## 2023-01-19 NOTE — PROGRESS NOTES
Health  Wellness Visit Note    Name: Royer Driscoll  Clinic Number: [id number]  Physician: Zulma Aj NP  Diagnosis: No diagnosis found.  Past Medical History:   Diagnosis Date    Allergy     Arthritis     back and shoulder pain    Chronic thoracic back pain     Vitamin D deficiency 2016     Visit Number: 51  Precautions: R shoulder      1st PT visit: 2/2/2022  Year of care end date: 2/2/2023  6 Month test  Performed: 10/24/2022  12 Month test performed: February 2023  Mind body plan: Plan A - 8 months, ends 2/7/2022 (+1 free)  Patient level: B    Time In: 11:05 AM  Time Out: 11:45 AM  Total Treatment Time: 40 Minutes    [facility]ness [facility] 2022  Handout on this week's wellness topic of Meditation was provided along with a discussion on what it means, the benefits, and suggestions for practice.  Reviewed last week's topic of Get Outdoors.    Subjective:   Patient reports he is back-pain free today. He has been very busy with his work lately and keeping moving with that. Since his last Wellness session, he has mapped out a plan for once he graduates from [employer]. He plans to workout every-other day at home. Patient has a home gym including: treadmill, a recumbent bike, barbell bench press, dumbell-free weights, jump-rope and more. He has a very busy weekend ahead of him but plans to stretch at least once daily. Patient does not ice his back at home.     [facility] ends 2/7/2023    Objective:   Royer completed therapeutic stretches (EIL, LOR) and the following MedX exercise machines: core lumbar, torso rotation l/r, leg extension, leg curl, upright row, chest press, biceps curl, triceps extension, leg press    See exercise log in patient folder for rate of exertion and repetitions completed.       Fitness Machine Education Key:  E=education on equipment initiated and further follow up and education needed  I=independent with  and exercise.  The patient:  Adjusts machines to his/her settings  Uses  equipment levers, pins, weights safely  Maintains safe and correct posture while exercising  Moves through exercise with correct pace and control  Gets on and off equipment safely      Lumbar/Cervical Ext. I Torso Rotation I Leg Press E   Leg Extension I Seated Leg Curl I Chest Press I   Seated Row I Hip ADD I Hip ABD I   Triceps Extension I Bicep Curl I Other:        Assessment:   Patient tolerated Patient tolerated MedX Core Lumbar Strength and all other peripheral exercises without an increase in symptoms. Patient warmed up on treadmill, stretched prior to workout. Patient skipped ice today.     Plan:  Continue with established plan of care towards wellness goals.     Health  : Catina Haskins  1/19/2023

## 2023-01-26 ENCOUNTER — DOCUMENTATION ONLY (OUTPATIENT)
Dept: REHABILITATION | Facility: HOSPITAL | Age: 58
End: 2023-01-26
Payer: MEDICAID

## 2023-02-01 ENCOUNTER — DOCUMENTATION ONLY (OUTPATIENT)
Dept: REHABILITATION | Facility: HOSPITAL | Age: 58
End: 2023-02-01
Payer: MEDICAID

## 2023-02-01 NOTE — PROGRESS NOTES
Health  Wellness Visit Note    Name: Royer Driscoll  Clinic Number: [id number]  Physician: Zulma Aj NP  Diagnosis: No diagnosis found.  Past Medical History:   Diagnosis Date    Allergy     Arthritis     back and shoulder pain    Chronic thoracic back pain     Vitamin D deficiency 2016     Visit Number: 53  Precautions: R shoulder      1st PT visit: 2/2/2022  Year of care end date: 2/2/2023  6 Month test  Performed: 10/24/2022  12 Month test performed: February 2023  Mind body plan: Plan A - 8 months, ends 2/7/2022 (+1 free)  Patient level: B    Time In: 10:50 AM  Time Out:  11:20 AM  Total Treatment Time: 30 Minutes    [employer] 2022  Handout on this week's wellness topic of Muscular Strength was provided along with a discussion on what it means, the benefits, and suggestions for practice.  Reviewed last week's topic of Kindness.    Subjective:   Patient reports he is back pain free today. He stayed very busy last week between work and traveling. On Sunday he drove back from [place] and had no back pain. He plans to set up his home gym before his graduation date from [employer]. His home gym includes: a treadmill, recumbent bike, barbell bench press, dumbbell-free weights and jump ropes. Patient only stretches when he is stiff or when he feels it is necessary. He does not ice his lower back at home.    [facility] ends 2/7/2023, Patient would like to test on last day!     Objective:   Royer completed therapeutic stretches (EIL, LOR) and the following MedX exercise machines: core lumbar, torso rotation l/r, leg extension, leg curl, upright row, chest press, biceps curl, triceps extension, leg press    See exercise log in patient folder for rate of exertion and repetitions completed.       Fitness Machine Education Key:  E=education on equipment initiated and further follow up and education needed  I=independent with  and exercise.  The patient:  Adjusts machines to his/her settings  Uses  equipment levers, pins, weights safely  Maintains safe and correct posture while exercising  Moves through exercise with correct pace and control  Gets on and off equipment safely      Lumbar/Cervical Ext. I Torso Rotation I Leg Press E   Leg Extension I Seated Leg Curl I Chest Press I   Seated Row I Hip ADD I Hip ABD I   Triceps Extension I Bicep Curl I Other:        Assessment:   Patient tolerated Patient tolerated MedX Core Lumbar Strength and all other peripheral exercises without an increase in symptoms. Patient warmed up on treadmill, stretched prior to workout. Patient skipped ice today.     Plan:  Continue with established plan of care towards wellness goals.     Health  : Catina Haskins  2/1/2023

## 2023-02-06 ENCOUNTER — DOCUMENTATION ONLY (OUTPATIENT)
Dept: REHABILITATION | Facility: HOSPITAL | Age: 58
End: 2023-02-06
Payer: MEDICAID

## 2023-02-06 NOTE — PROGRESS NOTES
Health  Wellness Visit Note    Name: Royer Driscoll  Clinic Number: [id number]  Physician: Zulma Aj NP  Diagnosis: No diagnosis found.  Past Medical History:   Diagnosis Date    Allergy     Arthritis     back and shoulder pain    Chronic thoracic back pain     Vitamin D deficiency 2016     Visit Number: 54 (JOLENE ends today)  Precautions: R shoulder      1st PT visit: 2/2/2022  Year of care end date: 2/2/2023  6 Month test  Performed: 10/24/2022  12 Month test performed: February 2023  Mind body plan: Plan A - 8 months, ends 2/7/2022   Patient level: B    Time In: 10:55 AM  Time Out: 11:45 AM  Total Treatment Time: 40 Minutes    Wellness [employer] 2022  Handout on this week's wellness topic of Cardiovascular Exercise was provided along with a discussion on what it means, the benefits, and suggestions for practice.  Reviewed last week's topic of Muscular Strength.    Subjective:   Patient reports for his last wellness session.  Reached JOLENE date. His back pain is 3/10 today.   He is 151% stronger compared to the initial PT visit.  His home gym includes: a treadmill, recumbent bike, barbell bench press, dumbbell-free weights and jump ropes. Patient only stretches when he is stiff or when he feels it is necessary. He does not ice his lower back at home.      Objective:   Royer completed therapeutic stretches (EIL, LOR) and the following MedX exercise machines: core lumbar, torso rotation l/r, leg extension, leg curl, upright row, chest press, biceps curl, triceps extension, leg press    See exercise log in patient folder for rate of exertion and repetitions completed.       Fitness Machine Education Key:  E=education on equipment initiated and further follow up and education needed  I=independent with  and exercise.  The patient:  Adjusts machines to his/her settings  Uses equipment levers, pins, weights safely  Maintains safe and correct posture while exercising  Moves through exercise with  correct pace and control  Gets on and off equipment safely      Lumbar/Cervical Ext. I Torso Rotation I Leg Press E   Leg Extension I Seated Leg Curl I Chest Press I   Seated Row I Hip ADD I Hip ABD I   Triceps Extension I Bicep Curl I Other:        Assessment:   Patient tolerated Patient tolerated MedX Core Lumbar Strength and all other peripheral exercises without an increase in symptoms. Patient warmed up on treadmill, stretched prior to workout. Patient had 10 mins on ice today.     Plan:  Continue with established plan of care towards wellness goals.     Health  : Ashley Cardoso  2/6/2023

## 2023-05-18 DIAGNOSIS — Z12.11 ENCOUNTER FOR SCREENING COLONOSCOPY: Primary | ICD-10-CM

## 2023-06-05 ENCOUNTER — PATIENT MESSAGE (OUTPATIENT)
Dept: SURGERY | Facility: CLINIC | Age: 58
End: 2023-06-05
Payer: MEDICAID

## 2023-06-05 ENCOUNTER — TELEPHONE (OUTPATIENT)
Dept: SURGERY | Facility: CLINIC | Age: 58
End: 2023-06-05
Payer: MEDICAID

## 2023-06-05 NOTE — TELEPHONE ENCOUNTER
----- Message from Cleo Jimenes sent at 6/5/2023  2:27 PM CDT -----  Regarding: Returning Call  .Type:  Patient Returning Call    Who Called: Self    Who Left Message for Patient: Nazia    Does the patient know what this is regarding?: Appt    Would the patient rather a call back or a response via [facility]? Call back    Best Call Back Number: [phone]     Additional Information:

## 2023-06-05 NOTE — TELEPHONE ENCOUNTER
Spoke with patient to reschedule due to provider out of office on patient scheduled date.  Patient requested a list of providers . Sent through my [facility].

## 2023-06-26 NOTE — PLAN OF CARE
[facility]  Physical Therapy Initial Evaluation  Date: 1/20/2022   Name: Royer Driscoll  Clinic Number: [id number]    Therapy Diagnosis:   Encounter Diagnoses   Name Primary?    Low back pain     Back pain with radiation Yes    Decreased functional activity tolerance     Weakness of both lower extremities      Physician: Zulma Aj NP     Physician Orders: PT Eval and Treat   Medical Diagnosis from Referral: Low back pain [M54.50]  Evaluation Date: 1/20/2022  Authorization Period Expiration: 12/31/2022  Plan of Care Expiration: 03/17/2022  Visit # / Visits authorized: 1/ 1   Progress Note Due: 02/20/2022  FOTO: 1/ 1    Precautions: Standard    Time In: 10:00  Time Out: 11:00   Total Appointment Time (timed & untimed codes): 60 minutes    Subjective   History of current condition - Royer reports: he was in the healthy back program a few years ago with great success. He states that over the past several months his pain has gotten increasingly worse and he would like to try the healthy back program again. The pain is constant and worse with bending and sitting.  The pain is in the back of the right thigh and with sitting too long it goes down to the calf.  It has been present since a fall in 2008.  He has had MRI and JUANITO injections, several years ago with increased pain.  He was told his L5 Nerve is irritated.  He has more pain with sitting walking and stairs.    1.  Where is your pain the worst? Central low back  2.  Is your pain constant or intermittent? Intermittent   3.  Does bending forward make your typical pain worse? No  4.  Since the start of your back pain, has there been a change in your bowel or bladder? No  5.  What can't you do now that you use to be able to do? No    OLIVER: insidious   Any Bowel and Bladder movement issues: no  Any falls: no  Any dizziness: no  Any Headache: no  Any injection in lower back: steroid or epidural: no   Pain radiates: no   Pain  "constant or intermitting: intermittent     Pain:  Current 2/10, worst 8/10, best 0/10 worse first thing in the morning   Location: Low back   Description: Aching and Dull  Aggravating Factors: prolonged sitting, prolonged standing   Easing Factors: standing     Prior Therapy: Yes; "a few years ago" healthy back program   Social History: lives with their family  Occupation: Audio/vidrographer   Prior Level of Function: indepdent with   Current Level of Function: unable to drive for long periods of time     Pts goals: "less pain and be able to sit for longer durations"     Imaging:  None for current compliant    Medical History:   Past Medical History:   Diagnosis Date    Allergy     Arthritis     back and shoulder pain    Chronic thoracic back pain     Vitamin D deficiency 2016       Surgical History:   Royer Driscoll  has a past surgical history that includes Ankle surgery; Knee surgery; and Colonoscopy (2010).    Medications:   Royer has a current medication list which includes the following prescription(s): acetaminophen, meloxicam, and vitamin d.    Allergies:   Review of patient's allergies indicates:  No Known Allergies       Objective   Observation: Pt ambulates into clinic with independence and no AD.     Posture Alignment: slouched posture;    Sensation: intact to light touch    DTR: intact    GAIT DEVIATIONS: Royer displays no significant gait deviations     Lumbar Range of Motion:    %   Flexion WFL  +increased hamstring tightness      Extension WFL     Left Side Bending WFL   Right Side Bending WFL   Left rotation   WFL   Right Rotation   WFL    *= pain     REPEATED TEST MOVEMENTS:    Baseline symptoms:  Repeated Flexion in Standing Increased tightness   No change   Repeated Extension in Standing No change   Repeated Flexion in lying Increase in pain    Repeated Extension in lying  Slight relief in pain       STATIC TESTS and other movements:   Baseline symptoms:  Prone lie Slight increases in pain "   Prone lie on elbows Increased pain  +tightness   Sustained prone with  using mat no effect   Sustained flexion no effect     Passive hip ROM in degrees:     Left Right   IR 35 38   ER WFL WFL     Lower Extremity Strength    Right LE  Left LE    Hip flexion: 4+/5 Hip flexion: 4+/5   Knee extension: 5/5 Knee extension: 5/5   Knee flexion: 5/5 Knee flexion: 5/5   Hip IR: 4+/5 Hip IR: 4+/5   Hip ER: 4+/5 Hip ER: 4+/5   Hip extension:  4+/5 Hip extension: 4+/5   Hip abduction: 4+/5 Hip abduction: 4+/5   Hip adduction: 4+/5 Hip adduction 4+/5   Ankle dorsiflexion: 5/5 Ankle dorsiflexion: 5/5   Ankle plantarflexion: 5/5 Ankle plantarflexion: 5/5     Special Tests:  -Quadrant testing: negative  -ANITA: negative  -Scour: negative  -Repeated Flexion: positive  -Repeated Ext:negative  -Piriformis Test: positive  -Prone Instability Test: negative  -Bridge Test: negative  -Sustained extension: positive  -Heel walking: negative  -Toe walking: negative  -Hip extension movement pattern: negative  -Long sitting test:negative  -Trendelenburg test :positive    SI provocation test:  Distraction test: negative  Compression test: negative  Thigh thrust test: negative  Gaenslens Right side tests: negative  Gaenslens Left side test: negative  Sacral thrust test: negative  3 out of all 6 provocation have sensitivity of .94 and specificity .78 for SIJ pathology     Neuro Dynamic Testing:    Sciatic nerve:      SLR: negative    Tibial bias: negative    Common Peroneal bias: negative   Femoral Nerve:    Femoral nerve test: negative   Neural Tension:     Slump test: negative    Joint Mobility: decreased joint mobility in  T7-T12; L1-L5    Flexibility: decreased soft tissue flexibility    Ely's test: R = + ; L = +   Popliteal Angle: R = 36 degrees ; L = 34 degrees      PT Evaluation Completed? Yes  Discussed Plan of Care with patient: Yes    CMS Impairment/Limitation/Restriction for FOTO  Survey    Therapist reviewed FOTO scores for Edward  Americo on 1/20/2022.   FOTO documents entered into [employer] - see Media section.    Limitation Score: 37%           TREATMENT   Treatment Time In: 10:45 am   Treatment Time Out: 11:00 am  Total Treatment time separate from Evaluation: 15 minutes    Royer received therapeutic exercises to develop strength, flexibility and core stabilization for 15 minutes including:  +Hamstring stretch  +Priformis stretch   +TrA activation      Home Exercises and Patient Education Provided    Education provided:   -HEP, POC  -Patient was educated on initial evaluation findings and expectations as well as future PT services, procedures, and expectations for optimal compliance with therapy    Written Home Exercises Provided: Patient instructed to cont prior HEP.  Exercises were reviewed and Royer was able to demonstrate them prior to the end of the session.  oRyer demonstrated good  understanding of the education provided.     See EMR under Patient Instructions for exercises provided 1/20/2022.    Assessment   Royer is a 56 y.o. male referred to outpatient Physical Therapy with a medical diagnosis of Low back pain [M54.50]. Pt presents with signs and symptoms consistent with diagnosis including weakness of bilateral lower extremity and core musculature, decreased soft tissue flexibility, poor posture and decreased functional mobility tolerance. These deficits are limiting patient in full participation in ADL's and leisure activities such as lifting, prolonged sitting and prolonged standing. Pt will benefit from skilled outpatient Physical Therapy to address the deficits stated above and in the chart below, provide pt/family education, and to maximize pt's level of independence.    Pt prognosis is Good.   Pt will benefit from skilled outpatient Physical Therapy to address the deficits stated above and in the chart below, provide pt/family education, and to maximize pt's level of independence.     Plan of care discussed with patient:  Yes  Pt's spiritual, cultural and educational needs considered and patient is agreeable to the plan of care and goals as stated below:     Anticipated Barriers for therapy: None    Medical Necessity is demonstrated by the following  History  Co-morbidities and personal factors that may impact the plan of care Co-morbidities:   None    Personal Factors:   no deficits     low   Examination  Body Structures and Functions, activity limitations and participation restrictions that may impact the plan of care Body Regions:   back  lower extremities  trunk    Body Systems:    gross symmetry  ROM  strength  gross coordinated movement  motor control  motor learning    Participation Restrictions:   None    Activity limitations:   Learning and applying knowledge  no deficits    General Tasks and Commands  no deficits    Communication  no deficits    Mobility  lifting and carrying objects  walking    Self care  no deficits    Domestic Life  no deficits    Interactions/Relationships  no deficits    Life Areas  no deficits    Community and Social Life  no deficits         Complexity: low  See FOTO outcome assessment   Clinical Presentation stable and uncomplicated low   Decision Making/ Complexity Score: low     GOALS: Short Term Goals:  4 weeks  1. Report decreased in pain at worse less than  <   / =  8  /10  to increase tolerance for functional activities.On going  3. Increased core and bilateral LE MMT 1/2  to increase tolerance for ADL and work activities.On going  5. Pt to tolerate HEP to improve ROM and independence with ADL's.On going  6. Pt to improve range of motion by 25% to allow for improved functional mobility to allow for improvement in IADLs. On going    Long Term Goals: 8 weeks  1. Report decreased in pain at worse less than  <   / =  1  /10  to increase tolerance for functional mobility.  On going  3. Increased core and bilateral LE MMT 1 grade to increase tolerance for ADL and work activities.On going  4. Pt  will report at 34% or less limitation on FOTO Lumbar spine survey  to demonstrate decrease in disability and improvement in back pain.On going  5. Pt to be Independent with HEP to improve ROM and independence with ADL's. On going  7. Pt to demonstrate negative Bridge Test in order to show improved core strength for lumbar stabilization. On going  8. Pt to improve range of motion by 75% to allow for improved functional mobility to allow for improvement in IADLs. On going    Plan   Plan of care Certification: 1/20/2022 to 03/17/2022    Outpatient Physical Therapy 2 times weekly for 8 weeks to include the following interventions: Cervical/Lumbar Traction, Electrical Stimulation PRN, Gait Training, Iontophoresis (with PRN), Manual Therapy, Moist Heat/ Ice, Neuromuscular Re-ed, Patient Education, Self Care, Therapeutic Activities and Therapeutic Exercise. Dry needling Progress HEP towards D/C. Recommend F/U with MD if symptoms worsen or do not resolve. Patient may be seen by a PTA for treatment to carry out their plan of care.  Face-to-face conferences will be held.  Jaspreet Ding, PT      I CERTIFY THE NEED FOR THESE SERVICES FURNISHED UNDER THIS PLAN OF TREATMENT AND WHILE UNDER MY CARE   Physician's comments:     Physician's Signature: ___________________________________________________      no

## 2024-01-12 ENCOUNTER — OFFICE VISIT (OUTPATIENT)
Dept: SURGERY | Facility: CLINIC | Age: 59
End: 2024-01-12
Payer: MEDICAID

## 2024-01-12 VITALS
WEIGHT: 216.69 LBS | DIASTOLIC BLOOD PRESSURE: 68 MMHG | HEART RATE: 57 BPM | SYSTOLIC BLOOD PRESSURE: 104 MMHG | BODY MASS INDEX: 30.34 KG/M2 | HEIGHT: 71 IN

## 2024-01-12 DIAGNOSIS — D17.0 LIPOMA OF NECK: Primary | ICD-10-CM

## 2024-01-12 PROCEDURE — 99212 OFFICE O/P EST SF 10 MIN: CPT | Mod: PBBFAC | Performed by: SURGERY

## 2024-01-12 PROCEDURE — 3008F BODY MASS INDEX DOCD: CPT | Mod: CPTII,,, | Performed by: SURGERY

## 2024-01-12 PROCEDURE — 99203 OFFICE O/P NEW LOW 30 MIN: CPT | Mod: S$PBB,,, | Performed by: SURGERY

## 2024-01-12 PROCEDURE — 3078F DIAST BP <80 MM HG: CPT | Mod: CPTII,,, | Performed by: SURGERY

## 2024-01-12 PROCEDURE — 99999 PR PBB SHADOW E&M-EST. PATIENT-LVL II: CPT | Mod: PBBFAC,,, | Performed by: SURGERY

## 2024-01-12 PROCEDURE — 3074F SYST BP LT 130 MM HG: CPT | Mod: CPTII,,, | Performed by: SURGERY

## 2024-01-12 PROCEDURE — 1159F MED LIST DOCD IN RCRD: CPT | Mod: CPTII,,, | Performed by: SURGERY

## 2024-01-12 NOTE — PROGRESS NOTES
57 y/o man with right sided posterior neck mass    PE: 1-2 cm soft tissue mass c/w lipoma    Impression: lipoma    Plan: offered patient excision in both office or OR, he would like to consider his options and will call clinic if he desires to proceed with excision.